# Patient Record
Sex: MALE | Race: WHITE | NOT HISPANIC OR LATINO | Employment: FULL TIME | URBAN - METROPOLITAN AREA
[De-identification: names, ages, dates, MRNs, and addresses within clinical notes are randomized per-mention and may not be internally consistent; named-entity substitution may affect disease eponyms.]

---

## 2023-04-03 DIAGNOSIS — E78.5 HYPERLIPIDEMIA, UNSPECIFIED: ICD-10-CM

## 2023-04-03 RX ORDER — ROSUVASTATIN CALCIUM 40 MG/1
TABLET, COATED ORAL
Qty: 90 TABLET | Refills: 0 | Status: SHIPPED | OUTPATIENT
Start: 2023-04-03 | End: 2023-07-03

## 2023-04-17 ENCOUNTER — HOSPITAL ENCOUNTER (OUTPATIENT)
Dept: DATA CONVERSION | Facility: HOSPITAL | Age: 73
End: 2023-04-18
Attending: ORTHOPAEDIC SURGERY | Admitting: ORTHOPAEDIC SURGERY
Payer: MEDICARE

## 2023-04-17 DIAGNOSIS — Z98.52 VASECTOMY STATUS: ICD-10-CM

## 2023-04-17 DIAGNOSIS — F17.200 NICOTINE DEPENDENCE, UNSPECIFIED, UNCOMPLICATED: ICD-10-CM

## 2023-04-17 DIAGNOSIS — I48.91 UNSPECIFIED ATRIAL FIBRILLATION (MULTI): ICD-10-CM

## 2023-04-17 DIAGNOSIS — Z79.891 LONG TERM (CURRENT) USE OF OPIATE ANALGESIC: ICD-10-CM

## 2023-04-17 DIAGNOSIS — E78.5 HYPERLIPIDEMIA, UNSPECIFIED: ICD-10-CM

## 2023-04-17 DIAGNOSIS — I71.20 THORACIC AORTIC ANEURYSM, WITHOUT RUPTURE, UNSPECIFIED (CMS-HCC): ICD-10-CM

## 2023-04-17 DIAGNOSIS — M24.052 LOOSE BODY IN LEFT HIP: ICD-10-CM

## 2023-04-17 DIAGNOSIS — M16.12 UNILATERAL PRIMARY OSTEOARTHRITIS, LEFT HIP: ICD-10-CM

## 2023-04-17 DIAGNOSIS — Z79.82 LONG TERM (CURRENT) USE OF ASPIRIN: ICD-10-CM

## 2023-04-17 DIAGNOSIS — M25.752 OSTEOPHYTE, LEFT HIP: ICD-10-CM

## 2023-04-18 LAB
ANION GAP IN SER/PLAS: 11 MMOL/L (ref 10–20)
BASOPHILS (10*3/UL) IN BLOOD BY AUTOMATED COUNT: 0.02 X10E9/L (ref 0–0.1)
BASOPHILS/100 LEUKOCYTES IN BLOOD BY AUTOMATED COUNT: 0.2 % (ref 0–2)
CALCIUM (MG/DL) IN SER/PLAS: 7.9 MG/DL (ref 8.6–10.3)
CARBON DIOXIDE, TOTAL (MMOL/L) IN SER/PLAS: 25 MMOL/L (ref 21–32)
CHLORIDE (MMOL/L) IN SER/PLAS: 104 MMOL/L (ref 98–107)
CREATININE (MG/DL) IN SER/PLAS: 1.04 MG/DL (ref 0.5–1.3)
EOSINOPHILS (10*3/UL) IN BLOOD BY AUTOMATED COUNT: 0.12 X10E9/L (ref 0–0.4)
EOSINOPHILS/100 LEUKOCYTES IN BLOOD BY AUTOMATED COUNT: 1.5 % (ref 0–6)
ERYTHROCYTE DISTRIBUTION WIDTH (RATIO) BY AUTOMATED COUNT: 13.1 % (ref 11.5–14.5)
ERYTHROCYTE MEAN CORPUSCULAR HEMOGLOBIN CONCENTRATION (G/DL) BY AUTOMATED: 30.2 G/DL (ref 32–36)
ERYTHROCYTE MEAN CORPUSCULAR VOLUME (FL) BY AUTOMATED COUNT: 98 FL (ref 80–100)
ERYTHROCYTES (10*6/UL) IN BLOOD BY AUTOMATED COUNT: 3.78 X10E12/L (ref 4.5–5.9)
GFR MALE: 75 ML/MIN/1.73M2
GLUCOSE (MG/DL) IN SER/PLAS: 93 MG/DL (ref 74–99)
HEMATOCRIT (%) IN BLOOD BY AUTOMATED COUNT: 37.1 % (ref 41–52)
HEMOGLOBIN (G/DL) IN BLOOD: 11.2 G/DL (ref 13.5–17.5)
IMMATURE GRANULOCYTES/100 LEUKOCYTES IN BLOOD BY AUTOMATED COUNT: 0.4 % (ref 0–0.9)
LEUKOCYTES (10*3/UL) IN BLOOD BY AUTOMATED COUNT: 8.1 X10E9/L (ref 4.4–11.3)
LYMPHOCYTES (10*3/UL) IN BLOOD BY AUTOMATED COUNT: 0.97 X10E9/L (ref 0.8–3)
LYMPHOCYTES/100 LEUKOCYTES IN BLOOD BY AUTOMATED COUNT: 12 % (ref 13–44)
MONOCYTES (10*3/UL) IN BLOOD BY AUTOMATED COUNT: 0.71 X10E9/L (ref 0.05–0.8)
MONOCYTES/100 LEUKOCYTES IN BLOOD BY AUTOMATED COUNT: 8.8 % (ref 2–10)
NEUTROPHILS (10*3/UL) IN BLOOD BY AUTOMATED COUNT: 6.2 X10E9/L (ref 1.6–5.5)
NEUTROPHILS/100 LEUKOCYTES IN BLOOD BY AUTOMATED COUNT: 77.1 % (ref 40–80)
PLATELETS (10*3/UL) IN BLOOD AUTOMATED COUNT: 144 X10E9/L (ref 150–450)
POTASSIUM (MMOL/L) IN SER/PLAS: 4.2 MMOL/L (ref 3.5–5.3)
SODIUM (MMOL/L) IN SER/PLAS: 136 MMOL/L (ref 136–145)
UREA NITROGEN (MG/DL) IN SER/PLAS: 16 MG/DL (ref 6–23)

## 2023-07-01 DIAGNOSIS — R35.1 NOCTURIA: ICD-10-CM

## 2023-07-01 DIAGNOSIS — E78.5 HYPERLIPIDEMIA, UNSPECIFIED: ICD-10-CM

## 2023-07-01 DIAGNOSIS — E78.5 DYSLIPIDEMIA, GOAL LDL BELOW 70: Primary | ICD-10-CM

## 2023-07-03 RX ORDER — ROSUVASTATIN CALCIUM 40 MG/1
TABLET, COATED ORAL
Qty: 90 TABLET | Refills: 0 | Status: SHIPPED | OUTPATIENT
Start: 2023-07-03 | End: 2023-10-05

## 2023-09-07 VITALS — BODY MASS INDEX: 27.02 KG/M2 | HEIGHT: 70 IN | WEIGHT: 188.71 LBS

## 2023-09-14 NOTE — DISCHARGE SUMMARY
Send Summary:   Discharge Summary Providers:  Provider Role Provider Name   · Attending CHRISSY COCHRAN   · Referring CHRISSY COCHRAN   · Consulting Stanton Lott   · Consulting Jozef Iraheta   · Primary Tequila Mayberry       Note Recipients: CHRISSY COCHRAN DO Mulligan, Erika, DO - [phone] [Preferred]       Discharge:    Summary:   Admission Date: .17-Apr-2023 06:05:00   Discharge Date: 18-Apr-2023   Attending Physician at Discharge: CHRISSY COCHRAN   Admission Reason: L DJD   Final Discharge Diagnoses: Osteoarthritis of hip   Procedures: Date: 17-Apr-2023 10:46:00  Procedure Name: 1. Direct anterior approach left total hip arthroplasty   2.   3.   4.   5.   Condition at Discharge: Satisfactory   Disposition at Discharge: .Home   Vital Signs:        T   P  R  BP   MAP  SpO2   Value  37  53  18  117/65      97%  Date/Time 4/17 22:10 4/18 5:31 4/18 5:31 4/18 5:31    4/18 5:31  Range  (37C - 37C )  (47 - 53 )  (18 - 18 )  (106 - 138 )/ (60 - 65 )    (97% - 98% )  Highest temp of 37 C was recorded at 4/17 12:15    Date:            Weight/Scale Type:  Height:   17-Apr-2023 12:24  85.6  kg         177.8  cm  Physical Exam:    Constitutional: Well developed, awake/alert/oriented x3, no distress, alert and cooperative  Head/Neck: NCAT  Respiratory/Thorax: Nonlabored breathing at rest  Cardiovascular: Warm, well perfused extremities  Extremities: See MSK  Neurological: Patient denies numbness and tingling of LLE  Psychological: Appropriate mood and behavior  Skin: Warm and dry. Surgical incision is clean, dry and intact.  Skin around the incision is soft, compressible, and non erythematous.   MSK: LLE  - SILT s/s/sp/dp/t  - fires PF/DF/EHL  - Toes WWP, 2+ DP pulses  - Calf soft and supple bilat   Hospital Course:    Patient was admitted s/p L DAA ALBERTO. Pt had uneventful hospital course. Pain was controlled with multimodal analgesics. Physical and Occupational therapy was started  POD 1 and patient progressed well. After  discussion and evaluation patient has elected to go home for continued care and physical therapy. Dressing is clean, dry and intact, with no signs of infection. At time of discharge the patient was instructed about  continued care and will follow up in office for further evaluation.       Discharge Information:    and Continuing Care:   Lab Results - Pending:    None  Radiology Results - Pending: Xray Fluoroscopy Up  To 1 Hr or less at 17-Apr-2023 15:04:00   Discharge Instructions:    Activity:           activity as tolerated.          May shower..            May not drive.            Weight-bearing Instructions: weight-bearing as tolerated left leg.            Total Hip Precautions Anterior approach: FOR 2 WEEKS: NO straight leg raises, NO backward kicks and NO bending over to put your shoes/socks or reach the floor. No crossing your legs or ankles.  Use ice frequently day and night for 2 weeks.   Continue to wear compression stockings everyday. May take stockings off at night to sleep but reapply each morning for the next 2 weeks until seen by your surgeon at your follow up appt.    Nutrition/Diet:           resume normal diet    Wound Care:           Wound Site:   Left Total Hip Replacement          Wound Type:   surgical incision          Cover With:   Mepilex          Instructions:   no lotions, creams, or tub soaks          Other Instructions:   Do not remove Mepilex AG dressing from the hip  until follow up visit in 2 weeks with doctor. If dressing becomes saturated and starts leaking  notify your surgeon.   Call the office if having increased redness, pain, swelling,  drainage at incision or if you have fever and chills.  May shower. pat dressing dry, no soap, no lotions and  no soaking.    Home Care Certification:           Home Care Agency:    [facility] [phone]          Skilled Disciplines Ordered:   PT    Home Care Services:           Home Care Skilled Service:   Rehab (PT/OT/SP eval and  treat)    Discharge Medications: Home Medication   Aspirin Enteric Coated 325 mg oral delayed release tablet - 1 tab(s) orally 2 times a day   CeleBREX 200 mg oral capsule - 1 cap(s) orally 2 times a day   Colace 100 mg oral capsule - 1 cap(s) orally 2 times a day   gabapentin 300 mg oral capsule - 1 cap(s) orally 3 times a day   oxycodone-acetaminophen 5 mg-325 mg oral tablet - 1 tab(s) orally 4 times a day (after meals and at bedtime) as needed for pain  Soma 350 mg oral tablet - 1 tab(s) orally 3 times a day as needed for muscle spasms  Vitamin C 500 mg oral tablet - 1 tab(s) orally 2 times a day   aspirin 81 mg oral tablet - null  rosuvastatin 40 mg oral tablet - 1 tab(s) orally once a day in the afternoon  dottera ongard - 1 dose(s)  once a day  bimatoprost 0.01% ophthalmic solution - 1 drop(s) to each affected eye once a day (at bedtime)     PRN Medication   cetirizine 10 mg oral tablet - 1 tab(s) orally once a day, As Needed     DNR Status:   ·  Code Status Code Status order at time of discharge: Full Code     Attestation:   Note Completion:  I am a:  Resident/Fellow   Attending Attestation I saw and evaluated the patient.  I personally obtained the key and critical portions of the history and physical exam or was physically present for key and  critical portions performed by the resident/fellow. I reviewed the resident/fellow?s documentation and discussed the patient with the resident/fellow.  I agree with the resident/fellow?s medical decision making as documented in the note.     I personally evaluated the patient on 18-Apr-2023         Electronic Signatures:  Ryann Levin (DO (Resident))  (Signed 18-Apr-2023 07:16)   Authored: Send Summary, Summary Content, Ongoing Care,  DNR Status, Note Completion  CHRISSY COCHRAN (DO)  (Signed 18-Apr-2023 07:34)   Authored: Note Completion   Co-Signer: Send Summary, Summary Content, Ongoing Care, DNR Status, Note Completion      Last Updated: 18-Apr-2023 07:34 by DIMAS  CHRISSY AGUAYO)

## 2023-09-14 NOTE — PROGRESS NOTES
Service: General Internal Medicine     Subjective Data:   JEANINE GARCIA is a 73 year old Male who is Hospital Day # 2 and POD #1 for 1. Direct anterior approach left total hip arthroplasty ;2. ;3. ;4. ;5.     Pt seen and examined at bedside today.   Doing well. No medical issues or concerns to address.  Reports pain very well controlled basically not there. Also having bowel movements per his report.    Objective Data:     Objective Information:      T   P  R  BP   MAP  SpO2   Value  37  53  18  117/65      97%  Date/Time 4/17 22:10 4/18 5:31 4/18 5:31 4/18 5:31    4/18 5:31  Range  (37C - 37C )  (47 - 53 )  (18 - 18 )  (106 - 138 )/ (60 - 65 )    (97% - 98% )  Highest temp of 37 C was recorded at 4/17 12:15      Pain reported at 4/18 7:52: 0 = None    Physical Exam Narrative:  ·  Physical Exam:    Constitutional: Pleasant, Awake/Alert/Oriented to person place and time. No distress  Cardiovascular: Regular rate and rhythm, S1, S2. No extra heart sounds or murmurs  Respiratory: Clear to auscultation bilaterally. No wheezing, rales or rhonchi. Good chest wall expansion  Abdomen: Soft, Nontender. Bowel sounds appreciated  Musculoskeletal: Left hip surgical dressing clean/dry/intact. No pain reported. Muscle strength appears grossly intact.   Neurological:  Sensation grossly intact. Muscle strength as stated above.  Extremities: Warm and dry. Surgical dressing left hip as stated above  Psychiatric: Appropriate mood and affect      Medication:    Medications:      CENTRAL NERVOUS SYSTEM AGENTS:    1. Acetaminophen:  650  mg  Oral  Every 6 Hours    2. Aspirin Enteric Coated:  325  mg  Oral  2 Times a Day    3. HYDROmorphone Injectable:  0.4  mg  IntraVenous Push  Every 2 Hours   PRN       4. oxyCODONE Immediate Release:  5  mg  Oral  Every 4 Hours   PRN       5. oxyCODONE Immediate Release:  10  mg  Oral  Every 4 Hours   PRN       6. traMADol:  50  mg  Oral  Every 6 Hours   PRN       7. Gabapentin:  300  mg  Oral   3 Times a Day   PRN       8. Ondansetron Injectable:  4  mg  IntraVenous Push  Every 6 Hours   PRN       9. Carisoprodol:  350  mg  Oral  Every 8 Hours   PRN         GASTROINTESTINAL AGENTS:    1. Docusate:  100  mg  Oral  2 Times a Day    2. Polyethylene Glycol:  17  gram(s)  Oral  2 Times a Day    3. Pantoprazole:  40  mg  Oral  Daily      METABOLIC AGENTS:    1. Rosuvastatin:  40  mg  Oral  Daily      NUTRITIONAL PRODUCTS:    1. Lactated Ringers Infusion:  1000  mL  IntraVenous  <Continuous>    2. Ascorbic Acid:  500  mg  Oral  2 Times a Day      RESPIRATORY AGENTS:    1. diphenhydrAMINE Injectable:  12.5  mg  IntraVenous Push  Every 6 Hours   PRN         TOPICAL AGENTS:    1. Sore Throat Lozenge:  1  lozenge(s)  Oral  Every 4 Hours   PRN       2. Latanoprost 0.005% Ophthalmic:  1  drop(s)  Both Eyes  At Bedtime      Recent Lab Results:    Results:    CBC: 4/18/2023 05:07              \     Hgb     /                              \     11.2 L    /  WBC  ----------------  Plt               8.1       ----------------    144 L            /     Hct     \                              /     37.1 L    \            RBC: 3.78 L    MCV: 98     Neutrophil %: 77.1      BMP: 4/18/2023 05:07  NA+        Cl-     BUN  /                         136    104    16  /  --------------------------------  Glucose                ---------------------------  93    K+     HCO3-   Creat \                         4.2  25    1.04  \  Calcium : 7.9 L    Anion Gap : 11      Radiology Results:    Results:        Impression:     Expected postoperative appearance.        Signed by Darrin Teran MD     Xray Pelvis 1 or 2 View [Apr 17 2023 11:40AM]      Assessment and Plan:   Code Status:  ·  Code Status Full Code     Assessment:    This is a 74yo male with medical history significant for Afib, HLD, OA, Thoracic aneurysm who had Direct Anterior Approach  Left Total Hip Arthroplasty by Dr. Buck on 4/17/23 for which medicine is consulted for  medical management.   Assessment/Plan:  # Left Hip Osteoarthritis   ·  Status post Direct Anterior Approach Left Total Hip Arthroplasty by Dr. Buck on 4/17/23  ·  Pain regimen and Bowel regimen per orthopaedic surgery primary service.   ·  Antibiotics per orthopaedic surgery primary service  ·  Defer DVT prophylaxis regimen to orthopaedic primary service who has on ASA 325mg PO BID    # HLD:  Rosuvastatin 40mg PO daily  # Afib: Not on any rate/rhythm control medications nor anticoagulation. Can defer further management at this time to outpatient providers and re-address need for medications should issues arise.     Fluids: No maintenance fluids at this time  Electrolytes: Monitor and address abnormalities as needed  Nutrition: Regular diet  GI prophylaxis: Protonix 40mg PO daily  DVT prophylaxis: ASA 325mg PO BID (per ortho primary), SCD    Disposition:  Patient medically clear for discharge from medicine consultant perspective. Discharge per the orthopaedic surgery primary service.   If any acute medical issues or questions arise, feel free to [place] myself (Stanton Lott DO) from 7am-7pm or nocturnist from 7pm-7am.  Thank you for allowing us to participate in management of this patient.       Electronic Signatures:  Stanton Lott)  (Signed 18-Apr-2023 09:51)   Authored: Service, Subjective Data, Objective Data, Assessment  and Plan, Note Completion      Last Updated: 18-Apr-2023 09:51 by Stanton Lott ()

## 2023-09-14 NOTE — H&P
History & Physical Reviewed:   I have reviewed the History and Physical dated:  29-Mar-2023   History and Physical reviewed and relevant findings noted. Patient examined to review pertinent physical  findings.: No significant changes   Home Medications Reviewed: no changes noted   Allergies Reviewed: no changes noted       ERAS (Enhanced Recovery After Surgery):  ·  ERAS Patient: no     Consent:   COVID-19 Consent:  ·  COVID-19 Risk Consent Surgeon has reviewed key risks related to the risk of anant COVID-19 and if they contract COVID-19 what the risks are.     Attestation:   Note Completion:  I am a:  Resident/Fellow   Attending Attestation I saw and evaluated the patient.  I personally obtained the key and critical portions of the history and physical exam or was physically present for key and  critical portions performed by the resident/fellow. I reviewed the resident/fellow?s documentation and discussed the patient with the resident/fellow.  I agree with the resident/fellow?s medical decision making as documented in the note.     I personally evaluated the patient on 17-Apr-2023         Electronic Signatures:  Ryann Levin (Resident))  (Signed 17-Apr-2023 06:21)   Authored: History & Physical Reviewed, ERAS, Consent,  Note Completion  CHRISSY COCHRAN (DO)  (Signed 17-Apr-2023 07:22)   Authored: Note Completion   Co-Signer: History & Physical Reviewed, ERAS, Consent, Note Completion      Last Updated: 17-Apr-2023 07:22 by CHRISSY COCHRAN ()

## 2023-10-02 NOTE — OP NOTE
Post Operative Note:     PreOp Diagnosis: Left Hip Osteoarthritis   Post-Procedure Diagnosis: Left hip osteoarthritis   Procedure: 1. Direct anterior approach left total  hip arthroplasty   2.   3.   4.   5.   Surgeon: Dr. Cochran   Resident/Fellow/Other Assistant: Minerva Levin   Anesthesia: Spinal, MAC   I.V. Fluids: See anesthesia report   Estimated Blood Loss (mL): 250   Blood Replacement: None   Specimen: no   Complications: None   Findings: See post op report   Patient Returned To/Condition: PACU; stable condition   Urine Output: See anesthesia report   Tourniquet Times: None   Implants: Pato     Operative Report Dictated:  Dictation: yes   Date of Dictation: 17-Apr-2023     Attestation:   Note Completion:  I am a: Advanced Practice Provider   Attending Only - Shared Visit with Advanced Practice Provider This is a shared visit.  I have reviewed the Advanced Practice Provider?s encounter note, approve the Advanced Practice Provider?s documentation,  and provide the following additional information from my personal encounter.    Attending Attestation I was present for the entire procedure    Comments/ Additional Findings    As Above          Electronic Signatures:  Sukumar Palma (PAC)  (Signed 17-Apr-2023 10:53)   Authored: Post Operative Note, Note Completion  CHRISSY COCHRAN (DO)  (Signed 17-Apr-2023 10:54)   Authored: Note Completion   Co-Signer: Post Operative Note, Note Completion      Last Updated: 17-Apr-2023 10:54 by CHRISSY COCHRAN (DO)

## 2023-10-05 DIAGNOSIS — E78.5 HYPERLIPIDEMIA, UNSPECIFIED: ICD-10-CM

## 2023-10-05 RX ORDER — ROSUVASTATIN CALCIUM 40 MG/1
TABLET, COATED ORAL
Qty: 90 TABLET | Refills: 0 | Status: SHIPPED | OUTPATIENT
Start: 2023-10-05 | End: 2024-01-11

## 2023-10-06 ENCOUNTER — APPOINTMENT (OUTPATIENT)
Dept: PRIMARY CARE | Facility: CLINIC | Age: 73
End: 2023-10-06
Payer: MEDICARE

## 2023-10-19 PROBLEM — H93.8X1 FULLNESS IN EAR, RIGHT: Status: ACTIVE | Noted: 2023-10-19

## 2023-10-19 PROBLEM — H90.3 BILATERAL SENSORINEURAL HEARING LOSS: Status: ACTIVE | Noted: 2023-10-19

## 2023-10-19 PROBLEM — H26.9 CATARACTS, BILATERAL: Status: ACTIVE | Noted: 2023-10-19

## 2023-10-19 PROBLEM — I71.20 THORACIC AORTIC ANEURYSM (TAA) (CMS-HCC): Status: ACTIVE | Noted: 2023-10-19

## 2023-10-19 PROBLEM — R93.1 ABNORMAL HEART SCORE CT: Status: ACTIVE | Noted: 2023-10-19

## 2023-10-19 PROBLEM — H69.91 DYSFUNCTION OF RIGHT EUSTACHIAN TUBE: Status: ACTIVE | Noted: 2023-10-19

## 2023-10-19 PROBLEM — E04.1 THYROID NODULE: Status: ACTIVE | Noted: 2023-10-19

## 2023-10-19 PROBLEM — R35.1 NOCTURIA: Status: ACTIVE | Noted: 2023-10-19

## 2023-10-19 PROBLEM — I48.91 ATRIAL FIBRILLATION (MULTI): Status: ACTIVE | Noted: 2023-10-19

## 2023-10-19 PROBLEM — R79.89 ELEVATED LFTS: Status: ACTIVE | Noted: 2023-10-19

## 2023-10-19 PROBLEM — B35.3 TINEA PEDIS OF BOTH FEET: Status: ACTIVE | Noted: 2023-10-19

## 2023-10-19 PROBLEM — E78.5 DYSLIPIDEMIA, GOAL LDL BELOW 70: Status: ACTIVE | Noted: 2023-10-19

## 2023-10-19 RX ORDER — DOCUSATE SODIUM 100 MG/1
CAPSULE, LIQUID FILLED ORAL
COMMUNITY
Start: 2023-04-14 | End: 2023-10-20 | Stop reason: ALTCHOICE

## 2023-10-19 RX ORDER — BIMATOPROST 0.1 MG/ML
SOLUTION/ DROPS OPHTHALMIC
COMMUNITY

## 2023-10-19 RX ORDER — CELECOXIB 200 MG/1
CAPSULE ORAL
COMMUNITY
Start: 2023-04-14 | End: 2023-10-20 | Stop reason: ALTCHOICE

## 2023-10-19 RX ORDER — BRIMONIDINE TARTRATE 1 MG/ML
SOLUTION/ DROPS OPHTHALMIC
COMMUNITY
Start: 2020-06-01 | End: 2023-10-20 | Stop reason: ALTCHOICE

## 2023-10-19 RX ORDER — GABAPENTIN 300 MG/1
CAPSULE ORAL
COMMUNITY
Start: 2023-04-14 | End: 2023-10-20 | Stop reason: ALTCHOICE

## 2023-10-19 RX ORDER — ASPIRIN 325 MG
TABLET, DELAYED RELEASE (ENTERIC COATED) ORAL
COMMUNITY
End: 2023-10-20 | Stop reason: ALTCHOICE

## 2023-10-19 RX ORDER — ROSUVASTATIN CALCIUM 10 MG/1
1 TABLET, COATED ORAL DAILY
COMMUNITY
End: 2023-10-20 | Stop reason: ALTCHOICE

## 2023-10-19 RX ORDER — CETIRIZINE HYDROCHLORIDE 10 MG/1
10 TABLET ORAL DAILY
COMMUNITY
End: 2023-11-07 | Stop reason: HOSPADM

## 2023-10-19 RX ORDER — ASPIRIN 81 MG/1
81 TABLET ORAL DAILY
COMMUNITY
End: 2023-11-07 | Stop reason: HOSPADM

## 2023-10-19 RX ORDER — APIXABAN 5 MG/1
1 TABLET, FILM COATED ORAL 2 TIMES DAILY
COMMUNITY
Start: 2023-01-03 | End: 2023-10-30 | Stop reason: SDDI

## 2023-10-19 RX ORDER — COLLAGENASE CLOSTRIDIUM HISTOLYTICUM 0.9 MG
KIT INJECTION
COMMUNITY
Start: 2023-01-06 | End: 2023-10-20 | Stop reason: ALTCHOICE

## 2023-10-20 ENCOUNTER — ANCILLARY PROCEDURE (OUTPATIENT)
Dept: PREADMISSION TESTING | Facility: HOSPITAL | Age: 73
End: 2023-10-20
Payer: MEDICARE

## 2023-10-20 VITALS
TEMPERATURE: 97.9 F | HEART RATE: 72 BPM | BODY MASS INDEX: 26.42 KG/M2 | HEIGHT: 70 IN | DIASTOLIC BLOOD PRESSURE: 73 MMHG | SYSTOLIC BLOOD PRESSURE: 129 MMHG | RESPIRATION RATE: 18 BRPM | WEIGHT: 184.53 LBS | OXYGEN SATURATION: 97 %

## 2023-10-20 DIAGNOSIS — Z01.818 PREOP EXAMINATION: Primary | ICD-10-CM

## 2023-10-20 DIAGNOSIS — I48.91 ATRIAL FIBRILLATION, UNSPECIFIED TYPE (MULTI): ICD-10-CM

## 2023-10-20 LAB
ANION GAP SERPL CALC-SCNC: 11 MMOL/L (ref 10–20)
APPEARANCE UR: CLEAR
BILIRUB UR STRIP.AUTO-MCNC: NEGATIVE MG/DL
BUN SERPL-MCNC: 8 MG/DL (ref 6–23)
CALCIUM SERPL-MCNC: 8.9 MG/DL (ref 8.6–10.3)
CHLORIDE SERPL-SCNC: 103 MMOL/L (ref 98–107)
CO2 SERPL-SCNC: 29 MMOL/L (ref 21–32)
COLOR UR: ABNORMAL
CREAT SERPL-MCNC: 0.97 MG/DL (ref 0.5–1.3)
ERYTHROCYTE [DISTWIDTH] IN BLOOD BY AUTOMATED COUNT: 12.9 % (ref 11.5–14.5)
GFR SERPL CREATININE-BSD FRML MDRD: 81 ML/MIN/1.73M*2
GLUCOSE SERPL-MCNC: 75 MG/DL (ref 74–99)
GLUCOSE UR STRIP.AUTO-MCNC: NEGATIVE MG/DL
HCT VFR BLD AUTO: 39.9 % (ref 41–52)
HGB BLD-MCNC: 12.7 G/DL (ref 13.5–17.5)
HOLD SPECIMEN: NORMAL
KETONES UR STRIP.AUTO-MCNC: NEGATIVE MG/DL
LEUKOCYTE ESTERASE UR QL STRIP.AUTO: NEGATIVE
MCH RBC QN AUTO: 28.8 PG (ref 26–34)
MCHC RBC AUTO-ENTMCNC: 31.8 G/DL (ref 32–36)
MCV RBC AUTO: 91 FL (ref 80–100)
NITRITE UR QL STRIP.AUTO: NEGATIVE
NRBC BLD-RTO: 0 /100 WBCS (ref 0–0)
PH UR STRIP.AUTO: 6 [PH]
PLATELET # BLD AUTO: 269 X10*3/UL (ref 150–450)
PMV BLD AUTO: 10.5 FL (ref 7.5–11.5)
POTASSIUM SERPL-SCNC: 3.9 MMOL/L (ref 3.5–5.3)
PROT UR STRIP.AUTO-MCNC: NEGATIVE MG/DL
RBC # BLD AUTO: 4.41 X10*6/UL (ref 4.5–5.9)
RBC # UR STRIP.AUTO: ABNORMAL /UL
RBC #/AREA URNS AUTO: NORMAL /HPF
SODIUM SERPL-SCNC: 139 MMOL/L (ref 136–145)
SP GR UR STRIP.AUTO: 1.01
UROBILINOGEN UR STRIP.AUTO-MCNC: <2 MG/DL
WBC # BLD AUTO: 10.1 X10*3/UL (ref 4.4–11.3)
WBC #/AREA URNS AUTO: NORMAL /HPF

## 2023-10-20 PROCEDURE — 87081 CULTURE SCREEN ONLY: CPT | Mod: CMCLAB,GEALAB

## 2023-10-20 PROCEDURE — 85027 COMPLETE CBC AUTOMATED: CPT

## 2023-10-20 PROCEDURE — 93010 ELECTROCARDIOGRAM REPORT: CPT | Performed by: INTERNAL MEDICINE

## 2023-10-20 PROCEDURE — 36415 COLL VENOUS BLD VENIPUNCTURE: CPT

## 2023-10-20 PROCEDURE — 82435 ASSAY OF BLOOD CHLORIDE: CPT

## 2023-10-20 PROCEDURE — 81001 URINALYSIS AUTO W/SCOPE: CPT

## 2023-10-20 PROCEDURE — 93005 ELECTROCARDIOGRAM TRACING: CPT

## 2023-10-20 PROCEDURE — 99214 OFFICE O/P EST MOD 30 MIN: CPT | Performed by: PHYSICIAN ASSISTANT

## 2023-10-20 RX ORDER — CHLORHEXIDINE GLUCONATE ORAL RINSE 1.2 MG/ML
15 SOLUTION DENTAL DAILY
Qty: 473 ML | Refills: 0 | Status: SHIPPED | OUTPATIENT
Start: 2023-10-20 | End: 2023-11-07 | Stop reason: HOSPADM

## 2023-10-20 ASSESSMENT — CHADS2 SCORE
CHF: NO
AGE GREATER THAN OR EQUAL TO 75: YES
CHADS2 SCORE: 1
HYPERTENSION: NO
DIABETES: NO
PRIOR STROKE OR TIA OR THROMBOEMBOLISM: NO

## 2023-10-20 ASSESSMENT — LIFESTYLE VARIABLES: SMOKING_STATUS: NONSMOKER

## 2023-10-20 ASSESSMENT — DUKE ACTIVITY SCORE INDEX (DASI)
CAN YOU TAKE CARE OF YOURSELF (EAT, DRESS, BATHE, OR USE TOILET): YES
CAN YOU CLIMB A FLIGHT OF STAIRS OR WALK UP A HILL: YES
TOTAL_SCORE: 34.7
CAN YOU DO LIGHT WORK AROUND THE HOUSE LIKE DUSTING OR WASHING DISHES: YES
CAN YOU WALK INDOORS, SUCH AS AROUND YOUR HOUSE: YES
CAN YOU DO MODERATE WORK AROUND THE HOUSE LIKE VACUUMING, SWEEPING FLOORS OR CARRYING GROCERIES: YES
CAN YOU DO HEAVY WORK AROUND THE HOUSE LIKE SCRUBBING FLOORS OR LIFTING AND MOVING HEAVY FURNITURE: NO
CAN YOU RUN A SHORT DISTANCE: NO
CAN YOU WALK A BLOCK OR TWO ON LEVEL GROUND: YES
CAN YOU DO YARD WORK LIKE RAKING LEAVES, WEEDING OR PUSHING A MOWER: YES
DASI METS SCORE: 7
CAN YOU HAVE SEXUAL RELATIONS: YES
CAN YOU PARTICIPATE IN MODERATE RECREATIONAL ACTIVITIES LIKE GOLF, BOWLING, DANCING, DOUBLES TENNIS OR THROWING A BASEBALL OR FOOTBALL: YES
CAN YOU PARTICIPATE IN STRENOUS SPORTS LIKE SWIMMING, SINGLES TENNIS, FOOTBALL, BASKETBALL, OR SKIING: NO

## 2023-10-20 ASSESSMENT — PAIN SCALES - GENERAL: PAINLEVEL_OUTOF10: 1

## 2023-10-20 ASSESSMENT — ENCOUNTER SYMPTOMS
CARDIOVASCULAR NEGATIVE: 1
NEUROLOGICAL NEGATIVE: 1
NECK NEGATIVE: 1
RESPIRATORY NEGATIVE: 1
GASTROINTESTINAL NEGATIVE: 1
LOSS OF MOTION: 1
CONSTITUTIONAL NEGATIVE: 1
MUSCULOSKELETAL NEGATIVE: 1
HIP PAIN: 1

## 2023-10-20 ASSESSMENT — PAIN - FUNCTIONAL ASSESSMENT: PAIN_FUNCTIONAL_ASSESSMENT: 0-10

## 2023-10-20 NOTE — CPM/PAT H&P
CPM/PAT Evaluation       Name: Ok Chris)  /Age: 1950/73 y.o.     In-Person       Chief Complaint: hip pain    Hip Pain   There was no injury mechanism. The pain is present in the right hip. The pain is at a severity of 1/10. The pain has been Fluctuating since onset. Associated symptoms include a loss of motion. He reports no foreign bodies present. Nothing aggravates the symptoms. He has tried NSAIDs, acetaminophen and rest for the symptoms.       Past Medical History:   Diagnosis Date    Atrial fibrillation (CMS/Formerly [facility])     Encounter for removal of sutures 2020    Visit for suture removal    Hyperlipidemia, unspecified 2019    Borderline hyperlipidemia    Laceration without foreign body of unspecified ear, initial encounter 2020    Laceration of ear lobe    OA (osteoarthritis)     Personal history of other diseases of the circulatory system 2016    History of irregular heartbeat    Personal history of other specified conditions 2020    History of syncope    Personal history of traumatic brain injury 2016    History of concussion    Thoracic aortic aneurysm (CMS/[facility]ly [facility])     3.7cm 2022       Past Surgical History:   Procedure Laterality Date    EYE SURGERY      HERNIA REPAIR  2016    Hernia Repair    OTHER SURGICAL HISTORY  2016    Dental Implant    TONSILLECTOMY  2016    Tonsillectomy    TOTAL HIP ARTHROPLASTY  2023    VASECTOMY  2016    Surgery Vas Deferens Vasectomy       Patient  has no history on file for sexual activity.    Family History   Problem Relation Name Age of Onset    Other (CARDIAC DISORDER) Mother      Heart failure Mother      Cancer Brother      Melanoma Brother      Heart attack Brother         Allergies   Allergen Reactions    Bee Venom Protein (Honey Bee) Unknown       Prior to Admission medications    Medication Sig Start Date End Date Taking? Authorizing Provider   aspirin 81 mg EC tablet Take 1 tablet (81  mg) by mouth once daily.   Yes Historical Provider, MD   cetirizine (ZyrTEC) 10 mg tablet Take 1 tablet (10 mg) by mouth once daily.   Yes Historical Provider, MD   Lumigan 0.01 % ophthalmic solution INSTILL 1 DROP INTO BOTH EYES ONCE PER DAY AT BEDTIME.   Yes Historical Provider, MD   rosuvastatin (Crestor) 40 mg tablet TAKE 1 TABLET BY MOUTH EVERY DAY  Patient taking differently: Pt takes in afternoon 10/5/23  Yes Tequila Mayberry,    chlorhexidine (Peridex) 0.12 % solution Use 15 mL in the mouth or throat once daily. Swish and spit one capful the night before surgery and morning  of surgery 10/20/23 1/18/24  [facility]   Eliquis 5 mg tablet Take 1 tablet (5 mg) by mouth 2 times a day. 1/3/23   Historical Provider, MD   aspirin 325 mg EC tablet Take by mouth.  10/20/23  Historical Provider, MD   brimonidine (Alphagan P) 0.1 % ophthalmic solution Administer into affected eye(s). 6/1/20 10/20/23  Historical Provider, MD   celecoxib (CeleBREX) 200 mg capsule Take by mouth. 4/14/23 10/20/23  Historical Provider, MD   docusate sodium (Colace) 100 mg capsule Take by mouth. 4/14/23 10/20/23  Historical Provider, MD   gabapentin (Neurontin) 300 mg capsule Take by mouth. 4/14/23 10/20/23  Historical Provider, MD   rosuvastatin (Crestor) 10 mg tablet Take 1 tablet (10 mg) by mouth once daily. Pt takes in afternoon  10/20/23  Historical Provider, MD   Xiaflex injection  1/6/23 10/20/23  Historical Provider, MD        PAT ROS:   Constitutional:   neg    Neuro/Psych:   neg    Eyes:   Ears:   Nose:   Mouth:   neg    Throat:   neg    Neck:   neg    Cardio:   neg    Respiratory:   neg    Endocrine:   GI:   neg    :   neg    Musculoskeletal:   neg    Hematologic:   neg    Skin:      Physical Exam  Vitals and nursing note reviewed.   Constitutional:       Appearance: Normal appearance.   HENT:      Head: Normocephalic and atraumatic.      Nose: Nose normal.      Mouth/Throat:      Mouth: Mucous membranes are  moist.      Pharynx: Oropharynx is clear.   Eyes:      Conjunctiva/sclera: Conjunctivae normal.      Pupils: Pupils are equal, round, and reactive to light.   Cardiovascular:      Rate and Rhythm: Normal rate and regular rhythm.      Pulses: Normal pulses.      Heart sounds: Normal heart sounds.   Abdominal:      General: Abdomen is flat. Bowel sounds are normal.      Palpations: Abdomen is soft.   Musculoskeletal:      Right hip: Decreased range of motion.      Left hip: Normal.      Right ankle: Normal.      Left ankle: Normal.   Skin:     General: Skin is warm and dry.   Neurological:      General: No focal deficit present.      Mental Status: He is alert.   Psychiatric:         Mood and Affect: Mood normal.         Behavior: Behavior normal.          PAT AIRWAY:   Airway:     Mallampati::  II    Neck ROM::  Full  normal        Vitals:    10/20/23 0837   BP: 129/73   Pulse: 72   Resp: 18   Temp: 36.6 °C (97.9 °F)   SpO2: 97%          DASI Risk Score      Flowsheet Row Most Recent Value   DASI SCORE 34.7   METS Score (Will be calculated only when all the questions are answered) 7          Caprini DVT Assessment      Flowsheet Row Most Recent Value   DVT Score 11   Current Status Major surgery planned, including arthroscopic and laproscopic (1-2 hours), Elective major lower extremity arthroplasty   History Prior major surgery   Age 60-75 years   BMI 30 or less          Modified Frailty Index    No data to display       CHADS2 Stroke Risk         N/A 3 - 100%: High Risk   2 - 3%: Medium Risk   0 - 2%: Low Risk     Last Change: N/A          This score determines the patient's risk of having a stroke if the patient has atrial fibrillation.        This score is not applicable to this patient. Components are not calculated.          Revised Cardiac Risk Index      Flowsheet Row Most Recent Value   Revised Cardiac Risk Calculator 0          Apfel Simplified Score      Flowsheet Row Most Recent Value   Apfel Simplified  Score Calculator 2          Risk Analysis Index Results This Encounter    No data found in the last 1 encounters.       Stop Bang Score      Flowsheet Row Most Recent Value   Do you snore loudly? 0   Do you often feel tired or fatigued after your sleep? 0   Has anyone ever observed you stop breathing in your sleep? 0   Do you have or are you being treated for high blood pressure? 0   Recent BMI (Calculated) 26.5   Is BMI greater than 35 kg/m2? 0=No   Age older than 50 years old? 1=Yes   Is your neck circumference greater than 17 inches (Male) or 16 inches (Female)? 0            Assessment and Plan:   Patient is a 73 year old male referred to PAT by  anticipating a  R ALBERTO.     PMH significant for:   Atrial fib: will get cardiac clearance, scheduled 10/31/23   OA: scheduled for surgery  Thoracic aneurysm: 3.7cm on CT angio chest 6/24/22     Anesthesia issues: no    H/O DVT: no     Sleep apnea: no    H/O transfusions: no      EKG:  10/20/23 atrial fib, nonspecific T wave changes     Clearances: Dr. Pena  Discussed patient with fercho Eden to proceed     Patient verbalized understanding of preop instructions given in PAT

## 2023-10-20 NOTE — PREPROCEDURE INSTRUCTIONS
Current Medications   Medication Instructions    aspirin 81 mg EC tablet Continue until night before surgery    cetirizine (ZyrTEC) 10 mg tablet Stop 1 day before surgery    Lumigan 0.01 % ophthalmic solution Continue until night before surgery    rosuvastatin (Crestor) 40 mg tablet Stop 1 day before surgery                       NPO Instructions:    Do not eat any food after midnight the night before your surgery/procedure.  You may have clear liquids until TWO hours before surgery/procedure. This includes water, black tea/coffee, (no milk or cream) apple juice and electrolyte drinks (Gatorade).  You may chew gum up to TWO hours before your surgery/procedure.    Additional Instructions:             SURGERY PRE-OPERATIVE INSTRUCTIONS    *You will receive a phone call the day before your procedure  after 2pm, (or the Friday before your surgery if scheduled on a Monday.) Generally the hospital will be calling you with this information after that time.    *You may take tylenol for pain/discomfort as needed.     *Stop taking all aspirins, ibuprofen (motrin/advil), naproxen (aleve/naprosyn) for one week prior to surgery.     *You should not have alcoholic beverages for 24 hours before surgery.     *You should not smoke 24 hours prior to surgery.     *To help prevent surgical infections bathe/shower with dial soap the evening before surgery.    *If you wear glasses, please bring a case for the glasses with you.    *You will be asked to remove dentures and contacts.     *Please leave all valuables at home.    *You should remove all make-up and nail polish at home.    *You should wear loose, comfortable clothing that will accommodate bandages and/or casts.    *You should notify your doctor of any change in your condition (fever, cold, rash, etc). Surgery may need to be re-scheduled until a time you are in better health.    *A responsible adult is required to accompany you to and from the hospital if you are receiving  anesthesia or a sedative. Patients are not permitted to drive for 24 hours after anesthesia.      *If you have any further questions please call [phone].                CHG INSTRUCTIONS    *Begin using your CHG soap five days prior to your scheduled surgery.  Allow the CHG soap to sit on skin for 3 minutes. Do not wash with regular soap after you have used the CHG soap. Pat yourself dry with a clean, fresh towel.    *Wash your face with normal soap and water. Apply the CHG solution to a clean, wet washcloth. Firmly lather your entire body from the neck down. Do not use on your face.     *Do not apply powders, deodorants, or lotions after using CHG wash.    *Dress in clean, freshly laundered night clothes.    *Be sure to sleep with clean, freshly laundered sheets.     *Be aware CHG wash may cause stains on fabrics. Rinse your washcloth and other linens that come in contact with CHG completely. Use non-chlorine detergents to launder items used.     *The morning of surgery is the fifth day, repeat the CHG wash and wear fresh laundered clothes.     *If you have any questions about the CHG soap, call [phone].      MOUTHRINSE INSTRUCTIONS    *CHG oral rinse is used to kill a bacteria in the mouth known as Staphylococcus aureus. This reduces the risks of surgical site infections.     *Using dental rinse: use the CHG oral rinse after you brush your teeth the night before and the morning of the surgery. Follow all directions on your prescription label.     *Use 1 capful (15ml), swish and gargle for at least 30 seconds. Do not swallow. Spit rinse out.     *Do not rinse mouth with water, eat or drink after using CHG mouth rinse.     *Possible side effects: CHG rinse will stick to plaque on teeth. Brush and floss just before use. Teeth brushing will help to avoid staining of plaque during use.    *Any questions, please call [phone].

## 2023-10-20 NOTE — H&P (VIEW-ONLY)
CPM/PAT Evaluation       Name: Ok Chris)  /Age: 1950/73 y.o.     In-Person       Chief Complaint: hip pain    Hip Pain   There was no injury mechanism. The pain is present in the right hip. The pain is at a severity of 1/10. The pain has been Fluctuating since onset. Associated symptoms include a loss of motion. He reports no foreign bodies present. Nothing aggravates the symptoms. He has tried NSAIDs, acetaminophen and rest for the symptoms.       Past Medical History:   Diagnosis Date    Atrial fibrillation (CMS/[facility])     Encounter for removal of sutures 2020    Visit for suture removal    Hyperlipidemia, unspecified 2019    Borderline hyperlipidemia    Laceration without foreign body of unspecified ear, initial encounter 2020    Laceration of ear lobe    OA (osteoarthritis)     Personal history of other diseases of the circulatory system 2016    History of irregular heartbeat    Personal history of other specified conditions 2020    History of syncope    Personal history of traumatic brain injury 2016    History of concussion    Thoracic aortic aneurysm (CMS/[facility])     3.7cm 2022       Past Surgical History:   Procedure Laterality Date    EYE SURGERY      HERNIA REPAIR  2016    Hernia Repair    OTHER SURGICAL HISTORY  2016    Dental Implant    TONSILLECTOMY  2016    Tonsillectomy    TOTAL HIP ARTHROPLASTY  2023    VASECTOMY  2016    Surgery Vas Deferens Vasectomy       Patient  has no history on file for sexual activity.    Family History   Problem Relation Name Age of Onset    Other (CARDIAC DISORDER) Mother      Heart failure Mother      Cancer Brother      Melanoma Brother      Heart attack Brother         Allergies   Allergen Reactions    Bee Venom Protein (Honey Bee) Unknown       Prior to Admission medications    Medication Sig Start Date End Date Taking? Authorizing Provider   aspirin 81 mg EC tablet Take 1 tablet (81  mg) by mouth once daily.   Yes Historical Provider, MD   cetirizine (ZyrTEC) 10 mg tablet Take 1 tablet (10 mg) by mouth once daily.   Yes Historical Provider, MD   Lumigan 0.01 % ophthalmic solution INSTILL 1 DROP INTO BOTH EYES ONCE PER DAY AT BEDTIME.   Yes Historical Provider, MD   rosuvastatin (Crestor) 40 mg tablet TAKE 1 TABLET BY MOUTH EVERY DAY  Patient taking differently: Pt takes in afternoon 10/5/23  Yes Tequila Mayberry,    chlorhexidine (Peridex) 0.12 % solution Use 15 mL in the mouth or throat once daily. Swish and spit one capful the night before surgery and morning  of surgery 10/20/23 1/18/24  [facility]   Eliquis 5 mg tablet Take 1 tablet (5 mg) by mouth 2 times a day. 1/3/23   Historical Provider, MD   aspirin 325 mg EC tablet Take by mouth.  10/20/23  Historical Provider, MD   brimonidine (Alphagan P) 0.1 % ophthalmic solution Administer into affected eye(s). 6/1/20 10/20/23  Historical Provider, MD   celecoxib (CeleBREX) 200 mg capsule Take by mouth. 4/14/23 10/20/23  Historical Provider, MD   docusate sodium (Colace) 100 mg capsule Take by mouth. 4/14/23 10/20/23  Historical Provider, MD   gabapentin (Neurontin) 300 mg capsule Take by mouth. 4/14/23 10/20/23  Historical Provider, MD   rosuvastatin (Crestor) 10 mg tablet Take 1 tablet (10 mg) by mouth once daily. Pt takes in afternoon  10/20/23  Historical Provider, MD   Xiaflex injection  1/6/23 10/20/23  Historical Provider, MD        PAT ROS:   Constitutional:   neg    Neuro/Psych:   neg    Eyes:   Ears:   Nose:   Mouth:   neg    Throat:   neg    Neck:   neg    Cardio:   neg    Respiratory:   neg    Endocrine:   GI:   neg    :   neg    Musculoskeletal:   neg    Hematologic:   neg    Skin:      Physical Exam  Vitals and nursing note reviewed.   Constitutional:       Appearance: Normal appearance.   HENT:      Head: Normocephalic and atraumatic.      Nose: Nose normal.      Mouth/Throat:      Mouth: Mucous membranes are  moist.      Pharynx: Oropharynx is clear.   Eyes:      Conjunctiva/sclera: Conjunctivae normal.      Pupils: Pupils are equal, round, and reactive to light.   Cardiovascular:      Rate and Rhythm: Normal rate and regular rhythm.      Pulses: Normal pulses.      Heart sounds: Normal heart sounds.   Abdominal:      General: Abdomen is flat. Bowel sounds are normal.      Palpations: Abdomen is soft.   Musculoskeletal:      Right hip: Decreased range of motion.      Left hip: Normal.      Right ankle: Normal.      Left ankle: Normal.   Skin:     General: Skin is warm and dry.   Neurological:      General: No focal deficit present.      Mental Status: He is alert.   Psychiatric:         Mood and Affect: Mood normal.         Behavior: Behavior normal.          PAT AIRWAY:   Airway:     Mallampati::  II    Neck ROM::  Full  normal        Vitals:    10/20/23 0837   BP: 129/73   Pulse: 72   Resp: 18   Temp: 36.6 °C (97.9 °F)   SpO2: 97%          DASI Risk Score      Flowsheet Row Most Recent Value   DASI SCORE 34.7   METS Score (Will be calculated only when all the questions are answered) 7          Caprini DVT Assessment      Flowsheet Row Most Recent Value   DVT Score 11   Current Status Major surgery planned, including arthroscopic and laproscopic (1-2 hours), Elective major lower extremity arthroplasty   History Prior major surgery   Age 60-75 years   BMI 30 or less          Modified Frailty Index    No data to display       CHADS2 Stroke Risk         N/A 3 - 100%: High Risk   2 - 3%: Medium Risk   0 - 2%: Low Risk     Last Change: N/A          This score determines the patient's risk of having a stroke if the patient has atrial fibrillation.        This score is not applicable to this patient. Components are not calculated.          Revised Cardiac Risk Index      Flowsheet Row Most Recent Value   Revised Cardiac Risk Calculator 0          Apfel Simplified Score      Flowsheet Row Most Recent Value   Apfel Simplified  Score Calculator 2          Risk Analysis Index Results This Encounter    No data found in the last 1 encounters.       Stop Bang Score      Flowsheet Row Most Recent Value   Do you snore loudly? 0   Do you often feel tired or fatigued after your sleep? 0   Has anyone ever observed you stop breathing in your sleep? 0   Do you have or are you being treated for high blood pressure? 0   Recent BMI (Calculated) 26.5   Is BMI greater than 35 kg/m2? 0=No   Age older than 50 years old? 1=Yes   Is your neck circumference greater than 17 inches (Male) or 16 inches (Female)? 0            Assessment and Plan:   Patient is a 73 year old male referred to PAT by  anticipating a  R ALBERTO.     PMH significant for:   Atrial fib: will get cardiac clearance, scheduled 10/31/23   OA: scheduled for surgery  Thoracic aneurysm: 3.7cm on CT angio chest 6/24/22     Anesthesia issues: no    H/O DVT: no     Sleep apnea: no    H/O transfusions: no      EKG:  10/20/23 atrial fib, nonspecific T wave changes     Clearances: Dr. Pena  Discussed patient with fercho Eden to proceed     Patient verbalized understanding of preop instructions given in PAT

## 2023-10-22 LAB — STAPHYLOCOCCUS SPEC CULT: NORMAL

## 2023-10-30 ENCOUNTER — OFFICE VISIT (OUTPATIENT)
Dept: PRIMARY CARE | Facility: CLINIC | Age: 73
End: 2023-10-30
Payer: MEDICARE

## 2023-10-30 VITALS
HEART RATE: 78 BPM | DIASTOLIC BLOOD PRESSURE: 79 MMHG | HEIGHT: 70 IN | SYSTOLIC BLOOD PRESSURE: 129 MMHG | BODY MASS INDEX: 25.48 KG/M2 | WEIGHT: 178 LBS

## 2023-10-30 DIAGNOSIS — I28.8 OTHER DISEASES OF PULMONARY VESSELS (MULTI): ICD-10-CM

## 2023-10-30 DIAGNOSIS — D64.9 ANEMIA, UNSPECIFIED TYPE: ICD-10-CM

## 2023-10-30 DIAGNOSIS — E78.5 DYSLIPIDEMIA, GOAL LDL BELOW 70: ICD-10-CM

## 2023-10-30 DIAGNOSIS — I48.11 LONGSTANDING PERSISTENT ATRIAL FIBRILLATION (MULTI): ICD-10-CM

## 2023-10-30 DIAGNOSIS — R35.1 NOCTURIA: ICD-10-CM

## 2023-10-30 DIAGNOSIS — Z00.00 ROUTINE GENERAL MEDICAL EXAMINATION AT HEALTH CARE FACILITY: Primary | ICD-10-CM

## 2023-10-30 PROCEDURE — 1036F TOBACCO NON-USER: CPT | Performed by: INTERNAL MEDICINE

## 2023-10-30 PROCEDURE — 99214 OFFICE O/P EST MOD 30 MIN: CPT | Performed by: INTERNAL MEDICINE

## 2023-10-30 PROCEDURE — 1160F RVW MEDS BY RX/DR IN RCRD: CPT | Performed by: INTERNAL MEDICINE

## 2023-10-30 PROCEDURE — 1170F FXNL STATUS ASSESSED: CPT | Performed by: INTERNAL MEDICINE

## 2023-10-30 PROCEDURE — 1159F MED LIST DOCD IN RCRD: CPT | Performed by: INTERNAL MEDICINE

## 2023-10-30 PROCEDURE — 1125F AMNT PAIN NOTED PAIN PRSNT: CPT | Performed by: INTERNAL MEDICINE

## 2023-10-30 PROCEDURE — G0439 PPPS, SUBSEQ VISIT: HCPCS | Performed by: INTERNAL MEDICINE

## 2023-10-30 ASSESSMENT — ACTIVITIES OF DAILY LIVING (ADL)
TAKING_MEDICATION: INDEPENDENT
BATHING: INDEPENDENT
GROCERY_SHOPPING: INDEPENDENT
DRESSING: INDEPENDENT
DOING_HOUSEWORK: INDEPENDENT
MANAGING_FINANCES: INDEPENDENT

## 2023-10-30 NOTE — PATIENT INSTRUCTIONS
"Chronic atrial fibrillation, has been off eliquis  Discussed risk of stroke, recommend restart after hip replacement  Has appt with cardiology tomorrow    Hyperlipidemia, get fasting labs    I recommend that you get the shingrix shots. Shingles vaccination requires a 2 shots series divided by 2 to 6 months. Get at the pharmacy. Ask the pharmacist \"how much\" prior to injected due cost varies based on your insurance. Shingrix can make you feel tired and achy for a few days after so do not get it prior to a big event . Are free currently    Recommend the covid booster    Checking psa for prostate screening.     Mild anemia, likely due to last hip surgery, checking iron, b12, blood counts    "

## 2023-10-30 NOTE — PROGRESS NOTES
Subjective   Patient ID: Ok Chris is a 73 y.o. male who presents for Medicare Annual Wellness Visit Subsequent.    HPI     Review of Systems    Objective   There were no vitals taken for this visit.    Physical Exam    Assessment/Plan

## 2023-10-30 NOTE — PROGRESS NOTES
"Subjective   Reason for Visit: Ok Chris is an 73 y.o. male here for a Medicare Wellness visit.     Past Medical, Surgical, and Family History reviewed and updated in chart.    Reviewed all medications by prescribing practitioner or clinical pharmacist (such as prescriptions, OTCs, herbal therapies and supplements) and documented in the medical record.    Having right hip replacement in 1 week, had a left ALBERTO 6 months ago  No problems with anesthesia with last one  No cp or pressure  No sob, or childs  No LH spells, no palpitations  Bowels are regular, no blood  Urine flow is good, nocturia 1-2,   He exercises in the early evening and drinks a lot of water   He does [employer] bike, does some weight and some cardio.  Is seeing cardiology for cardiac clearance tomorrow    He was on aspirin after his last hip  Has not been taking his eliquis.               Patient Care Team:  Tequila Mayberry DO as PCP - General  Tequila Mayberry DO as PCP - [employer] PCP     Review of Systems    Objective   Vitals:  /79   Pulse 78   Ht 1.778 m (5' 10\")   Wt 80.7 kg (178 lb)   BMI 25.54 kg/m²       Physical Exam  Constitutional:       Appearance: Normal appearance.   HENT:      Head: Normocephalic.   Neck:      Vascular: No carotid bruit.   Cardiovascular:      Rate and Rhythm: Normal rate. Rhythm irregular.      Heart sounds: No murmur heard.  Pulmonary:      Effort: Pulmonary effort is normal.      Breath sounds: Normal breath sounds.   Chest:   Breasts:     Right: No mass.      Left: No mass.   Abdominal:      General: Abdomen is flat.      Palpations: Abdomen is soft. There is no mass.      Tenderness: There is no abdominal tenderness.      Comments: No HSM   Musculoskeletal:         General: No swelling or deformity.      Right lower leg: No edema.      Left lower leg: No edema.   Lymphadenopathy:      Cervical: No cervical adenopathy.      Upper Body:      Right upper body: No supraclavicular or " "axillary adenopathy.      Left upper body: No supraclavicular or axillary adenopathy.   Skin:     Coloration: Skin is not jaundiced or pale.      Findings: No rash.   Neurological:      Mental Status: He is alert and oriented to person, place, and time.   Psychiatric:         Mood and Affect: Mood normal.         Assessment/Plan   Problem List Items Addressed This Visit    None  Visit Diagnoses       Routine general medical examination at health care facility    -  Primary            Patient Instructions   Chronic atrial fibrillation, has been off eliquis  Discussed risk of stroke, recommend restart after hip replacement    Hyperlipidemia, get fasting labs    I recommend that you get the shingrix shots. Shingles vaccination requires a 2 shots series divided by 2 to 6 months. Get at the pharmacy. Ask the pharmacist \"how much\" prior to injected due cost varies based on your insurance. Shingrix can make you feel tired and achy for a few days after so do not get it prior to a big event . Are free currently    Recommend the covid booster    Checking psa for prostate screening.     Mild anemia, likely due to last hip surgery, checking iron, b12, blood counts        "

## 2023-10-31 ENCOUNTER — LAB (OUTPATIENT)
Dept: LAB | Facility: LAB | Age: 73
End: 2023-10-31
Payer: MEDICARE

## 2023-10-31 ENCOUNTER — OFFICE VISIT (OUTPATIENT)
Dept: CARDIOLOGY | Facility: HOSPITAL | Age: 73
End: 2023-10-31
Payer: MEDICARE

## 2023-10-31 VITALS
DIASTOLIC BLOOD PRESSURE: 63 MMHG | HEART RATE: 50 BPM | OXYGEN SATURATION: 99 % | WEIGHT: 186.51 LBS | SYSTOLIC BLOOD PRESSURE: 111 MMHG | BODY MASS INDEX: 26.76 KG/M2

## 2023-10-31 DIAGNOSIS — I48.11 LONGSTANDING PERSISTENT ATRIAL FIBRILLATION (MULTI): ICD-10-CM

## 2023-10-31 DIAGNOSIS — Z01.818 PRE-OPERATIVE CLEARANCE: Primary | ICD-10-CM

## 2023-10-31 DIAGNOSIS — E78.5 DYSLIPIDEMIA, GOAL LDL BELOW 70: ICD-10-CM

## 2023-10-31 DIAGNOSIS — I48.21 PERMANENT ATRIAL FIBRILLATION (MULTI): ICD-10-CM

## 2023-10-31 DIAGNOSIS — R35.1 NOCTURIA: ICD-10-CM

## 2023-10-31 DIAGNOSIS — D64.9 ANEMIA, UNSPECIFIED TYPE: ICD-10-CM

## 2023-10-31 DIAGNOSIS — E78.5 HYPERLIPIDEMIA, UNSPECIFIED: ICD-10-CM

## 2023-10-31 LAB
ALBUMIN SERPL BCP-MCNC: 4.2 G/DL (ref 3.4–5)
ALP SERPL-CCNC: 92 U/L (ref 33–136)
ALT SERPL W P-5'-P-CCNC: 12 U/L (ref 10–52)
ANION GAP SERPL CALC-SCNC: 13 MMOL/L (ref 10–20)
AST SERPL W P-5'-P-CCNC: 17 U/L (ref 9–39)
BASOPHILS # BLD AUTO: 0.03 X10*3/UL (ref 0–0.1)
BASOPHILS NFR BLD AUTO: 0.4 %
BILIRUB SERPL-MCNC: 0.6 MG/DL (ref 0–1.2)
BUN SERPL-MCNC: 8 MG/DL (ref 6–23)
CALCIUM SERPL-MCNC: 9 MG/DL (ref 8.6–10.3)
CHLORIDE SERPL-SCNC: 105 MMOL/L (ref 98–107)
CHOLEST SERPL-MCNC: 133 MG/DL (ref 0–199)
CHOLESTEROL/HDL RATIO: 3.2
CK SERPL-CCNC: 84 U/L (ref 0–325)
CO2 SERPL-SCNC: 29 MMOL/L (ref 21–32)
CREAT SERPL-MCNC: 0.92 MG/DL (ref 0.5–1.3)
EOSINOPHIL # BLD AUTO: 0.19 X10*3/UL (ref 0–0.4)
EOSINOPHIL NFR BLD AUTO: 2.8 %
ERYTHROCYTE [DISTWIDTH] IN BLOOD BY AUTOMATED COUNT: 13.4 % (ref 11.5–14.5)
FERRITIN SERPL-MCNC: 563 NG/ML (ref 20–300)
GFR SERPL CREATININE-BSD FRML MDRD: 87 ML/MIN/1.73M*2
GLUCOSE SERPL-MCNC: 85 MG/DL (ref 74–99)
HCT VFR BLD AUTO: 43.1 % (ref 41–52)
HDLC SERPL-MCNC: 42.1 MG/DL
HGB BLD-MCNC: 12.9 G/DL (ref 13.5–17.5)
IMM GRANULOCYTES # BLD AUTO: 0.02 X10*3/UL (ref 0–0.5)
IMM GRANULOCYTES NFR BLD AUTO: 0.3 % (ref 0–0.9)
IRON SATN MFR SERPL: 25 % (ref 25–45)
IRON SERPL-MCNC: 81 UG/DL (ref 35–150)
LDLC SERPL CALC-MCNC: 79 MG/DL
LYMPHOCYTES # BLD AUTO: 1.5 X10*3/UL (ref 0.8–3)
LYMPHOCYTES NFR BLD AUTO: 22.3 %
MCH RBC QN AUTO: 28.4 PG (ref 26–34)
MCHC RBC AUTO-ENTMCNC: 29.9 G/DL (ref 32–36)
MCV RBC AUTO: 95 FL (ref 80–100)
MONOCYTES # BLD AUTO: 0.49 X10*3/UL (ref 0.05–0.8)
MONOCYTES NFR BLD AUTO: 7.3 %
NEUTROPHILS # BLD AUTO: 4.5 X10*3/UL (ref 1.6–5.5)
NEUTROPHILS NFR BLD AUTO: 66.9 %
NON HDL CHOLESTEROL: 91 MG/DL (ref 0–149)
NRBC BLD-RTO: 0 /100 WBCS (ref 0–0)
PLATELET # BLD AUTO: 285 X10*3/UL (ref 150–450)
PMV BLD AUTO: 10.3 FL (ref 7.5–11.5)
POTASSIUM SERPL-SCNC: 4.8 MMOL/L (ref 3.5–5.3)
PROT SERPL-MCNC: 6.6 G/DL (ref 6.4–8.2)
RBC # BLD AUTO: 4.54 X10*6/UL (ref 4.5–5.9)
SODIUM SERPL-SCNC: 142 MMOL/L (ref 136–145)
TIBC SERPL-MCNC: 318 UG/DL (ref 240–445)
TRIGL SERPL-MCNC: 61 MG/DL (ref 0–149)
TSH SERPL-ACNC: 1.87 MIU/L (ref 0.44–3.98)
UIBC SERPL-MCNC: 237 UG/DL (ref 110–370)
VLDL: 12 MG/DL (ref 0–40)
WBC # BLD AUTO: 6.7 X10*3/UL (ref 4.4–11.3)

## 2023-10-31 PROCEDURE — 82550 ASSAY OF CK (CPK): CPT

## 2023-10-31 PROCEDURE — 1160F RVW MEDS BY RX/DR IN RCRD: CPT | Performed by: NURSE PRACTITIONER

## 2023-10-31 PROCEDURE — 84153 ASSAY OF PSA TOTAL: CPT

## 2023-10-31 PROCEDURE — 1125F AMNT PAIN NOTED PAIN PRSNT: CPT | Performed by: NURSE PRACTITIONER

## 2023-10-31 PROCEDURE — 85025 COMPLETE CBC W/AUTO DIFF WBC: CPT

## 2023-10-31 PROCEDURE — 99214 OFFICE O/P EST MOD 30 MIN: CPT | Performed by: NURSE PRACTITIONER

## 2023-10-31 PROCEDURE — 82728 ASSAY OF FERRITIN: CPT

## 2023-10-31 PROCEDURE — 80061 LIPID PANEL: CPT

## 2023-10-31 PROCEDURE — 1159F MED LIST DOCD IN RCRD: CPT | Performed by: NURSE PRACTITIONER

## 2023-10-31 PROCEDURE — 84443 ASSAY THYROID STIM HORMONE: CPT

## 2023-10-31 PROCEDURE — 82607 VITAMIN B-12: CPT

## 2023-10-31 PROCEDURE — 80053 COMPREHEN METABOLIC PANEL: CPT

## 2023-10-31 PROCEDURE — 1036F TOBACCO NON-USER: CPT | Performed by: NURSE PRACTITIONER

## 2023-10-31 PROCEDURE — 83550 IRON BINDING TEST: CPT

## 2023-10-31 PROCEDURE — 83540 ASSAY OF IRON: CPT

## 2023-10-31 PROCEDURE — 36415 COLL VENOUS BLD VENIPUNCTURE: CPT

## 2023-10-31 NOTE — LETTER
October 31, 2023     Bebeto Buck [place]  [id number] [facility] [address]    Patient: Ok Chris   YOB: 1950   Date of Visit: 10/31/2023       Dear Dr. Bebeto Buck DO:    Thank you for referring Ok Chris to me for evaluation. Below are my notes for this consultation.  If you have questions, please do not hesitate to call me. I look forward to following your patient along with you.       Sincerely,     Deeptih Angela, ANGELINA-CNP      CC: No Recipients  ______________________________________________________________________________________    Chief Complaint:   Pre-op Clearance     History Of Present Illness:    Ok Chris is a 73 y.o. male presenting today for pre-operative cardiovascular evaluation prior to upcoming right hip replacement surgery.  Has a h/p permanent a.fib which he has tolerated for many years.  Denies symptoms of chest pain, SOB, palpitations, or dizziness.  Was advised in January to start taking Apixaban given increase in [id number] score d/t age.  He did obtain the prescription from the pharmacy, however, did not start taking it because at that time he had left hip surgery scheduled.  He also did not start taking apixaban after hip surgery.       Last Recorded Vitals:  Vitals:    10/31/23 1013   BP: 111/63   Pulse: 50   SpO2: 99%   Weight: 84.6 kg (186 lb 8.2 oz)       Past Medical History:  He has a past medical history of Atrial fibrillation (CMS/HCC), Encounter for removal of sutures (07/01/2020), Hyperlipidemia, unspecified (03/12/2019), Laceration without foreign body of unspecified ear, initial encounter (07/01/2020), OA (osteoarthritis), Personal history of other diseases of the circulatory system (06/23/2016), Personal history of other specified conditions (07/30/2020), Personal history of traumatic brain injury (06/24/2016), and Thoracic aortic aneurysm (CMS/HCC).    Past Surgical History:  He has a past surgical history that  includes Tonsillectomy (06/24/2016); Vasectomy (06/24/2016); Hernia repair (06/23/2016); Other surgical history (06/23/2016); Total hip arthroplasty (04/2023); and Eye surgery.      Social History:  He reports that he has never smoked. He has never used smokeless tobacco. He reports that he does not currently use alcohol. He reports current drug use. Drug: Marijuana.    Family History:  Family History   Problem Relation Name Age of Onset   • Other (CARDIAC DISORDER) Mother     • Heart failure Mother     • Cancer Brother     • Melanoma Brother     • Heart attack Brother          Allergies:  Bee venom protein (honey bee)    Outpatient Medications:  Current Outpatient Medications   Medication Instructions   • aspirin 81 mg, oral, Daily   • cetirizine (ZYRTEC) 10 mg, oral, Daily   • chlorhexidine (Peridex) 0.12 % solution 15 mL, Mouth/Throat, Daily, Swish and spit one capful the night before surgery and morning  of surgery   • Lumigan 0.01 % ophthalmic solution INSTILL 1 DROP INTO BOTH EYES ONCE PER DAY AT BEDTIME.   • rosuvastatin (Crestor) 40 mg tablet TAKE 1 TABLET BY MOUTH EVERY DAY       Physical Exam:  Constitutional: Pleasant, Awake/Alert/Oriented to person place and time. No distress  Head: Atraumatic, Normocephalic  Eyes: EOMI. MAYELA  Neck:No JVD  Cardiovascular: irreg, irreg, no murmur   Respiratory: Clear to auscultation bilaterally. No wheezing, rales or rhonchi. Good chest wall expansion  Abdomen: Soft, Nontender, Obese. Bowel sounds appreciated  Musculoskeletal: ROM intact. Muscle strength grossly intact upper and lower extremities 5/5.   Neurological: CNII-XII intact. Sensation grossly intact  Extremities: Warm and dry. No acute rashes and lesions  Psychiatric: Appropriate mood and affect       Last Labs:  CBC -  Lab Results   Component Value Date    WBC 10.1 10/20/2023    HGB 12.7 (L) 10/20/2023    HCT 39.9 (L) 10/20/2023    MCV 91 10/20/2023     10/20/2023       CMP -  Lab Results   Component  "Value Date    CALCIUM 8.9 10/20/2023    PROT 6.5 04/27/2022    ALBUMIN 4.3 04/27/2022    AST 18 04/27/2022    ALT 13 04/27/2022    ALKPHOS 77 04/27/2022    BILITOT 1.1 04/27/2022       LIPID PANEL -   Lab Results   Component Value Date    CHOL 141 04/27/2022    TRIG 54 04/27/2022    HDL 47.8 04/27/2022    CHHDL 2.9 04/27/2022    LDLF 82 04/27/2022    VLDL 11 04/27/2022       RENAL FUNCTION PANEL -   Lab Results   Component Value Date    GLUCOSE 75 10/20/2023     10/20/2023    K 3.9 10/20/2023     10/20/2023    CO2 29 10/20/2023    ANIONGAP 11 10/20/2023    BUN 8 10/20/2023    CREATININE 0.97 10/20/2023    GFRMALE 75 04/18/2023    CALCIUM 8.9 10/20/2023    ALBUMIN 4.3 04/27/2022        No results found for: \"BNP\", \"HGBA1C\"    Last Cardiology Tests:  ECG:  10/20/2023 independently reviewed: erica, HR 72bpm    Echo:  6/2/2020  CONCLUSIONS:   1. The left ventricular systolic function is normal with a 55-60% estimated ejection fraction.   2. Spectral Doppler shows an abnormal pattern of left ventricular diastolic filling.   3. The left atrium is severely dilated.   4. The right atrium is severely dilated.   5. Mild to moderate mitral valve regurgitation.   6. Mild aortic valve stenosis.   7. There is mild aortic valve regurgitation.   8. The patient is in atrial fibrillation which may influence the estimate of left ventricular function and transvalvular flows.    Stress Test:  7/29/2020  IMPRESSION:  1. Normal myocardial perfusion study without evidence of ischemia or  prior infarction.  2. The left ventricle is normal in size.  3. Normal LV wall motion with an LV EF estimated at greater than 50%.      Lab review: I have personally reviewed the laboratory result(s)   Diagnostic review: I have personally reviewed the result(s) of the Echocardiogram and stress     Assessment/Plan  Very pleasant 72 yo male from home with h/o permanent a.fib, dyslipidemia presenting today for pre-operative cardiac evaluation " prior to upcoming right hip replacement surgery.      Plan:  -May proceed to the OR with mild risk for perioperative cardiovascular event.   -[id number] score is 2-- recommend starting apixaban 5mg BID post-operatively when safe from an orthopedic standpoint  -Follow up in 1 year or sooner if needed       ANGELINA Velazco-CNP

## 2023-10-31 NOTE — PROGRESS NOTES
Chief Complaint:   Pre-op Clearance     History Of Present Illness:    Ok Chris is a 73 y.o. male presenting today for pre-operative cardiovascular evaluation prior to upcoming right hip replacement surgery.  Has a h/p permanent a.fib which he has tolerated for many years.  Denies symptoms of chest pain, SOB, palpitations, or dizziness.  Was advised in January to start taking Apixaban given increase in [id number] score d/t age.  He did obtain the prescription from the pharmacy, however, did not start taking it because at that time he had left hip surgery scheduled.  He also did not start taking apixaban after hip surgery.       Last Recorded Vitals:  Vitals:    10/31/23 1013   BP: 111/63   Pulse: 50   SpO2: 99%   Weight: 84.6 kg (186 lb 8.2 oz)       Past Medical History:  He has a past medical history of Atrial fibrillation (CMS/[facility]), Encounter for removal of sutures (07/01/2020), Hyperlipidemia, unspecified (03/12/2019), Laceration without foreign body of unspecified ear, initial encounter (07/01/2020), OA (osteoarthritis), Personal history of other diseases of the circulatory system (06/23/2016), Personal history of other specified conditions (07/30/2020), Personal history of traumatic brain injury (06/24/2016), and Thoracic aortic aneurysm (CMS/[facility]).    Past Surgical History:  He has a past surgical history that includes Tonsillectomy (06/24/2016); Vasectomy (06/24/2016); Hernia repair (06/23/2016); Other surgical history (06/23/2016); Total hip arthroplasty (04/2023); and Eye surgery.      Social History:  He reports that he has never smoked. He has never used smokeless tobacco. He reports that he does not currently use alcohol. He reports current drug use. Drug: Marijuana.    Family History:  Family History   Problem Relation Name Age of Onset    Other (CARDIAC DISORDER) Mother      Heart failure Mother      Cancer Brother      Melanoma Brother      Heart attack Brother          Allergies:  Bee venom  protein (honey bee)    Outpatient Medications:  Current Outpatient Medications   Medication Instructions    aspirin 81 mg, oral, Daily    cetirizine (ZYRTEC) 10 mg, oral, Daily    chlorhexidine (Peridex) 0.12 % solution 15 mL, Mouth/Throat, Daily, Swish and spit one capful the night before surgery and morning  of surgery    Lumigan 0.01 % ophthalmic solution INSTILL 1 DROP INTO BOTH EYES ONCE PER DAY AT BEDTIME.    rosuvastatin (Crestor) 40 mg tablet TAKE 1 TABLET BY MOUTH EVERY DAY       Physical Exam:  Constitutional: Pleasant, Awake/Alert/Oriented to person place and time. No distress  Head: Atraumatic, Normocephalic  Eyes: EOMI. MAYELA  Neck:No JVD  Cardiovascular: irreg, irreg, no murmur   Respiratory: Clear to auscultation bilaterally. No wheezing, rales or rhonchi. Good chest wall expansion  Abdomen: Soft, Nontender, Obese. Bowel sounds appreciated  Musculoskeletal: ROM intact. Muscle strength grossly intact upper and lower extremities 5/5.   Neurological: CNII-XII intact. Sensation grossly intact  Extremities: Warm and dry. No acute rashes and lesions  Psychiatric: Appropriate mood and affect       Last Labs:  CBC -  Lab Results   Component Value Date    WBC 10.1 10/20/2023    HGB 12.7 (L) 10/20/2023    HCT 39.9 (L) 10/20/2023    MCV 91 10/20/2023     10/20/2023       CMP -  Lab Results   Component Value Date    CALCIUM 8.9 10/20/2023    PROT 6.5 04/27/2022    ALBUMIN 4.3 04/27/2022    AST 18 04/27/2022    ALT 13 04/27/2022    ALKPHOS 77 04/27/2022    BILITOT 1.1 04/27/2022       LIPID PANEL -   Lab Results   Component Value Date    CHOL 141 04/27/2022    TRIG 54 04/27/2022    HDL 47.8 04/27/2022    CHHDL 2.9 04/27/2022    LDLF 82 04/27/2022    VLDL 11 04/27/2022       RENAL FUNCTION PANEL -   Lab Results   Component Value Date    GLUCOSE 75 10/20/2023     10/20/2023    K 3.9 10/20/2023     10/20/2023    CO2 29 10/20/2023    ANIONGAP 11 10/20/2023    BUN 8 10/20/2023    CREATININE 0.97  "10/20/2023    GFRMALE 75 04/18/2023    CALCIUM 8.9 10/20/2023    ALBUMIN 4.3 04/27/2022        No results found for: \"BNP\", \"HGBA1C\"    Last Cardiology Tests:  ECG:  10/20/2023 independently reviewed: erica, HR 72bpm    Echo:  6/2/2020  CONCLUSIONS:   1. The left ventricular systolic function is normal with a 55-60% estimated ejection fraction.   2. Spectral Doppler shows an abnormal pattern of left ventricular diastolic filling.   3. The left atrium is severely dilated.   4. The right atrium is severely dilated.   5. Mild to moderate mitral valve regurgitation.   6. Mild aortic valve stenosis.   7. There is mild aortic valve regurgitation.   8. The patient is in atrial fibrillation which may influence the estimate of left ventricular function and transvalvular flows.    Stress Test:  7/29/2020  IMPRESSION:  1. Normal myocardial perfusion study without evidence of ischemia or  prior infarction.  2. The left ventricle is normal in size.  3. Normal LV wall motion with an LV EF estimated at greater than 50%.      Lab review: I have personally reviewed the laboratory result(s)   Diagnostic review: I have personally reviewed the result(s) of the Echocardiogram and stress     Assessment/Plan   Very pleasant 74 yo male from home with h/o permanent a.fib, dyslipidemia presenting today for pre-operative cardiac evaluation prior to upcoming right hip replacement surgery.      Plan:  -May proceed to the OR with mild risk for perioperative cardiovascular event.   -[id number] score is 2-- recommend starting apixaban 5mg BID post-operatively when safe from an orthopedic standpoint  -Follow up in 1 year or sooner if needed       ANGELINA Velazco-CNP  "

## 2023-11-01 ENCOUNTER — ANESTHESIA EVENT (OUTPATIENT)
Dept: OPERATING ROOM | Facility: HOSPITAL | Age: 73
End: 2023-11-01
Payer: MEDICARE

## 2023-11-01 LAB
PSA SERPL-MCNC: 0.97 NG/ML
VIT B12 SERPL-MCNC: 348 PG/ML (ref 211–911)

## 2023-11-05 PROBLEM — Z96.641 STATUS POST TOTAL HIP REPLACEMENT, RIGHT: Status: ACTIVE | Noted: 2023-11-05

## 2023-11-06 ENCOUNTER — HOME HEALTH ADMISSION (OUTPATIENT)
Dept: HOME HEALTH SERVICES | Facility: HOME HEALTH | Age: 73
End: 2023-11-06
Payer: MEDICARE

## 2023-11-06 ENCOUNTER — HOSPITAL ENCOUNTER (OUTPATIENT)
Facility: HOSPITAL | Age: 73
Discharge: HOME | End: 2023-11-07
Attending: ORTHOPAEDIC SURGERY | Admitting: ORTHOPAEDIC SURGERY
Payer: MEDICARE

## 2023-11-06 ENCOUNTER — HOSPITAL ENCOUNTER (OUTPATIENT)
Dept: CARDIOLOGY | Facility: HOSPITAL | Age: 73
Discharge: HOME | End: 2023-11-06
Payer: MEDICARE

## 2023-11-06 ENCOUNTER — APPOINTMENT (OUTPATIENT)
Dept: RADIOLOGY | Facility: HOSPITAL | Age: 73
End: 2023-11-06
Payer: MEDICARE

## 2023-11-06 ENCOUNTER — ANESTHESIA (OUTPATIENT)
Dept: OPERATING ROOM | Facility: HOSPITAL | Age: 73
End: 2023-11-06
Payer: MEDICARE

## 2023-11-06 DIAGNOSIS — Z96.641 STATUS POST TOTAL HIP REPLACEMENT, RIGHT: Primary | ICD-10-CM

## 2023-11-06 LAB
ABO GROUP (TYPE) IN BLOOD: NORMAL
ANTIBODY SCREEN: NORMAL
RH FACTOR (ANTIGEN D): NORMAL

## 2023-11-06 PROCEDURE — 93005 ELECTROCARDIOGRAM TRACING: CPT

## 2023-11-06 PROCEDURE — 99222 1ST HOSP IP/OBS MODERATE 55: CPT | Performed by: NURSE PRACTITIONER

## 2023-11-06 PROCEDURE — 9420000001 HC RT PATIENT EDUCATION 5 MIN

## 2023-11-06 PROCEDURE — 97110 THERAPEUTIC EXERCISES: CPT | Mod: GP

## 2023-11-06 PROCEDURE — 3600000018 HC OR TIME - INITIAL BASE CHARGE - PROCEDURE LEVEL SIX: Performed by: ORTHOPAEDIC SURGERY

## 2023-11-06 PROCEDURE — 76000 FLUOROSCOPY <1 HR PHYS/QHP: CPT

## 2023-11-06 PROCEDURE — 86850 RBC ANTIBODY SCREEN: CPT

## 2023-11-06 PROCEDURE — 2500000001 HC RX 250 WO HCPCS SELF ADMINISTERED DRUGS (ALT 637 FOR MEDICARE OP)

## 2023-11-06 PROCEDURE — A4216 STERILE WATER/SALINE, 10 ML: HCPCS | Performed by: ORTHOPAEDIC SURGERY

## 2023-11-06 PROCEDURE — 2500000004 HC RX 250 GENERAL PHARMACY W/ HCPCS (ALT 636 FOR OP/ED): Performed by: ANESTHESIOLOGY

## 2023-11-06 PROCEDURE — 72170 X-RAY EXAM OF PELVIS: CPT | Mod: FY

## 2023-11-06 PROCEDURE — 7100000001 HC RECOVERY ROOM TIME - INITIAL BASE CHARGE: Performed by: ORTHOPAEDIC SURGERY

## 2023-11-06 PROCEDURE — 36415 COLL VENOUS BLD VENIPUNCTURE: CPT

## 2023-11-06 PROCEDURE — 2500000004 HC RX 250 GENERAL PHARMACY W/ HCPCS (ALT 636 FOR OP/ED)

## 2023-11-06 PROCEDURE — 97161 PT EVAL LOW COMPLEX 20 MIN: CPT | Mod: GP

## 2023-11-06 PROCEDURE — 3700000002 HC GENERAL ANESTHESIA TIME - EACH INCREMENTAL 1 MINUTE: Performed by: ORTHOPAEDIC SURGERY

## 2023-11-06 PROCEDURE — A27130 PR TOTAL HIP ARTHROPLASTY: Performed by: NURSE ANESTHETIST, CERTIFIED REGISTERED

## 2023-11-06 PROCEDURE — 7100000011 HC EXTENDED STAY RECOVERY HOURLY - NURSING UNIT

## 2023-11-06 PROCEDURE — 2500000004 HC RX 250 GENERAL PHARMACY W/ HCPCS (ALT 636 FOR OP/ED): Performed by: NURSE ANESTHETIST, CERTIFIED REGISTERED

## 2023-11-06 PROCEDURE — 86901 BLOOD TYPING SEROLOGIC RH(D): CPT

## 2023-11-06 PROCEDURE — 93010 ELECTROCARDIOGRAM REPORT: CPT | Performed by: INTERNAL MEDICINE

## 2023-11-06 PROCEDURE — C1776 JOINT DEVICE (IMPLANTABLE): HCPCS | Performed by: ORTHOPAEDIC SURGERY

## 2023-11-06 PROCEDURE — 2780000003 HC OR 278 NO HCPCS: Performed by: ORTHOPAEDIC SURGERY

## 2023-11-06 PROCEDURE — 3600000017 HC OR TIME - EACH INCREMENTAL 1 MINUTE - PROCEDURE LEVEL SIX: Performed by: ORTHOPAEDIC SURGERY

## 2023-11-06 PROCEDURE — 72170 X-RAY EXAM OF PELVIS: CPT | Performed by: RADIOLOGY

## 2023-11-06 PROCEDURE — 2720000007 HC OR 272 NO HCPCS: Performed by: ORTHOPAEDIC SURGERY

## 2023-11-06 PROCEDURE — A6213 FOAM DRG >16<=48 SQ IN W/BDR: HCPCS | Performed by: ORTHOPAEDIC SURGERY

## 2023-11-06 PROCEDURE — 7100000002 HC RECOVERY ROOM TIME - EACH INCREMENTAL 1 MINUTE: Performed by: ORTHOPAEDIC SURGERY

## 2023-11-06 PROCEDURE — 96372 THER/PROPH/DIAG INJ SC/IM: CPT | Performed by: ORTHOPAEDIC SURGERY

## 2023-11-06 PROCEDURE — 2500000005 HC RX 250 GENERAL PHARMACY W/O HCPCS: Performed by: NURSE ANESTHETIST, CERTIFIED REGISTERED

## 2023-11-06 PROCEDURE — 2500000004 HC RX 250 GENERAL PHARMACY W/ HCPCS (ALT 636 FOR OP/ED): Performed by: ORTHOPAEDIC SURGERY

## 2023-11-06 PROCEDURE — 3700000001 HC GENERAL ANESTHESIA TIME - INITIAL BASE CHARGE: Performed by: ORTHOPAEDIC SURGERY

## 2023-11-06 DEVICE — IMPLANTABLE DEVICE: Type: IMPLANTABLE DEVICE | Site: HIP | Status: FUNCTIONAL

## 2023-11-06 DEVICE — SHELL, TRIDENT II, CLUSTERHOLE, 56F: Type: IMPLANTABLE DEVICE | Site: HIP | Status: FUNCTIONAL

## 2023-11-06 DEVICE — INSERT, TRIDENT X3 POLYETHYLENE, 0 DEG, 36MM F: Type: IMPLANTABLE DEVICE | Site: HIP | Status: FUNCTIONAL

## 2023-11-06 DEVICE — HEAD, FEMUR V40 36MM 0MM BIOLOX DELTA: Type: IMPLANTABLE DEVICE | Site: HIP | Status: FUNCTIONAL

## 2023-11-06 RX ORDER — CEFAZOLIN SODIUM 2 G/100ML
2 INJECTION, SOLUTION INTRAVENOUS EVERY 8 HOURS
Status: COMPLETED | OUTPATIENT
Start: 2023-11-06 | End: 2023-11-07

## 2023-11-06 RX ORDER — ROPIVACAINE HYDROCHLORIDE 5 MG/ML
INJECTION, SOLUTION EPIDURAL; INFILTRATION; PERINEURAL AS NEEDED
Status: DISCONTINUED | OUTPATIENT
Start: 2023-11-06 | End: 2023-11-06 | Stop reason: HOSPADM

## 2023-11-06 RX ORDER — ASPIRIN 325 MG
325 TABLET, DELAYED RELEASE (ENTERIC COATED) ORAL 2 TIMES DAILY
Status: SHIPPED | OUTPATIENT
Start: 2023-11-06 | End: 2023-12-06

## 2023-11-06 RX ORDER — SODIUM CHLORIDE, SODIUM LACTATE, POTASSIUM CHLORIDE, CALCIUM CHLORIDE 600; 310; 30; 20 MG/100ML; MG/100ML; MG/100ML; MG/100ML
100 INJECTION, SOLUTION INTRAVENOUS CONTINUOUS
Status: DISCONTINUED | OUTPATIENT
Start: 2023-11-06 | End: 2023-11-06

## 2023-11-06 RX ORDER — NALOXONE HYDROCHLORIDE 0.4 MG/ML
0.2 INJECTION, SOLUTION INTRAMUSCULAR; INTRAVENOUS; SUBCUTANEOUS EVERY 5 MIN PRN
Status: DISCONTINUED | OUTPATIENT
Start: 2023-11-06 | End: 2023-11-07 | Stop reason: HOSPADM

## 2023-11-06 RX ORDER — MORPHINE SULFATE 2 MG/ML
2 INJECTION, SOLUTION INTRAMUSCULAR; INTRAVENOUS EVERY 2 HOUR PRN
Status: DISCONTINUED | OUTPATIENT
Start: 2023-11-06 | End: 2023-11-07 | Stop reason: HOSPADM

## 2023-11-06 RX ORDER — GABAPENTIN 300 MG/1
300 CAPSULE ORAL 3 TIMES DAILY PRN
Qty: 42 CAPSULE | Refills: 0 | COMMUNITY
Start: 2023-11-06 | End: 2024-01-04 | Stop reason: ALTCHOICE

## 2023-11-06 RX ORDER — SODIUM CHLORIDE, SODIUM LACTATE, POTASSIUM CHLORIDE, CALCIUM CHLORIDE 600; 310; 30; 20 MG/100ML; MG/100ML; MG/100ML; MG/100ML
100 INJECTION, SOLUTION INTRAVENOUS CONTINUOUS
Status: DISCONTINUED | OUTPATIENT
Start: 2023-11-06 | End: 2023-11-07 | Stop reason: HOSPADM

## 2023-11-06 RX ORDER — CEFAZOLIN SODIUM 2 G/50ML
2 SOLUTION INTRAVENOUS ONCE
Status: DISCONTINUED | OUTPATIENT
Start: 2023-11-06 | End: 2023-11-06 | Stop reason: HOSPADM

## 2023-11-06 RX ORDER — IPRATROPIUM BROMIDE AND ALBUTEROL SULFATE 2.5; .5 MG/3ML; MG/3ML
3 SOLUTION RESPIRATORY (INHALATION) ONCE
Status: DISCONTINUED | OUTPATIENT
Start: 2023-11-06 | End: 2023-11-06 | Stop reason: HOSPADM

## 2023-11-06 RX ORDER — CELECOXIB 100 MG/1
200 CAPSULE ORAL 2 TIMES DAILY PRN
Status: DISCONTINUED | OUTPATIENT
Start: 2023-11-06 | End: 2023-11-07 | Stop reason: HOSPADM

## 2023-11-06 RX ORDER — ASCORBIC ACID 500 MG
500 TABLET ORAL 2 TIMES DAILY
Qty: 60 TABLET | Refills: 0 | COMMUNITY
Start: 2023-11-06 | End: 2023-11-06 | Stop reason: ALTCHOICE

## 2023-11-06 RX ORDER — ONDANSETRON 4 MG/1
4 TABLET, FILM COATED ORAL EVERY 8 HOURS PRN
Status: DISCONTINUED | OUTPATIENT
Start: 2023-11-06 | End: 2023-11-07 | Stop reason: HOSPADM

## 2023-11-06 RX ORDER — PROPOFOL 10 MG/ML
INJECTION, EMULSION INTRAVENOUS AS NEEDED
Status: DISCONTINUED | OUTPATIENT
Start: 2023-11-06 | End: 2023-11-06

## 2023-11-06 RX ORDER — SODIUM CHLORIDE 9 MG/ML
INJECTION, SOLUTION INTRAMUSCULAR; INTRAVENOUS; SUBCUTANEOUS AS NEEDED
Status: DISCONTINUED | OUTPATIENT
Start: 2023-11-06 | End: 2023-11-06 | Stop reason: HOSPADM

## 2023-11-06 RX ORDER — ASPIRIN 325 MG
325 TABLET, DELAYED RELEASE (ENTERIC COATED) ORAL 2 TIMES DAILY
Status: DISCONTINUED | OUTPATIENT
Start: 2023-11-06 | End: 2023-11-07 | Stop reason: HOSPADM

## 2023-11-06 RX ORDER — ROSUVASTATIN CALCIUM 20 MG/1
40 TABLET, COATED ORAL DAILY
Status: DISCONTINUED | OUTPATIENT
Start: 2023-11-06 | End: 2023-11-07 | Stop reason: HOSPADM

## 2023-11-06 RX ORDER — FENTANYL CITRATE 50 UG/ML
INJECTION, SOLUTION INTRAMUSCULAR; INTRAVENOUS AS NEEDED
Status: DISCONTINUED | OUTPATIENT
Start: 2023-11-06 | End: 2023-11-06

## 2023-11-06 RX ORDER — TRANEXAMIC ACID 100 MG/ML
INJECTION, SOLUTION INTRAVENOUS AS NEEDED
Status: DISCONTINUED | OUTPATIENT
Start: 2023-11-06 | End: 2023-11-06

## 2023-11-06 RX ORDER — DOCUSATE SODIUM 100 MG/1
100 CAPSULE, LIQUID FILLED ORAL 2 TIMES DAILY
Status: DISCONTINUED | OUTPATIENT
Start: 2023-11-06 | End: 2023-11-07 | Stop reason: HOSPADM

## 2023-11-06 RX ORDER — VASOPRESSIN 20 U/ML
INJECTION PARENTERAL AS NEEDED
Status: DISCONTINUED | OUTPATIENT
Start: 2023-11-06 | End: 2023-11-06

## 2023-11-06 RX ORDER — HYDROMORPHONE HYDROCHLORIDE 2 MG/ML
INJECTION, SOLUTION INTRAMUSCULAR; INTRAVENOUS; SUBCUTANEOUS AS NEEDED
Status: DISCONTINUED | OUTPATIENT
Start: 2023-11-06 | End: 2023-11-06

## 2023-11-06 RX ORDER — CEFAZOLIN 1 G/1
INJECTION, POWDER, FOR SOLUTION INTRAVENOUS AS NEEDED
Status: DISCONTINUED | OUTPATIENT
Start: 2023-11-06 | End: 2023-11-06

## 2023-11-06 RX ORDER — CARISOPRODOL 350 MG/1
350 TABLET ORAL 3 TIMES DAILY PRN
Status: DISCONTINUED | OUTPATIENT
Start: 2023-11-06 | End: 2023-11-07 | Stop reason: HOSPADM

## 2023-11-06 RX ORDER — CELECOXIB 200 MG/1
200 CAPSULE ORAL 2 TIMES DAILY
Qty: 60 CAPSULE | Refills: 0 | COMMUNITY
Start: 2023-11-06 | End: 2024-01-04 | Stop reason: ALTCHOICE

## 2023-11-06 RX ORDER — MIDAZOLAM HYDROCHLORIDE 1 MG/ML
INJECTION INTRAMUSCULAR; INTRAVENOUS AS NEEDED
Status: DISCONTINUED | OUTPATIENT
Start: 2023-11-06 | End: 2023-11-06

## 2023-11-06 RX ORDER — TRAMADOL HYDROCHLORIDE 50 MG/1
50 TABLET ORAL EVERY 6 HOURS PRN
Status: DISCONTINUED | OUTPATIENT
Start: 2023-11-06 | End: 2023-11-07 | Stop reason: HOSPADM

## 2023-11-06 RX ORDER — LIDOCAINE HYDROCHLORIDE 10 MG/ML
INJECTION INFILTRATION; PERINEURAL AS NEEDED
Status: DISCONTINUED | OUTPATIENT
Start: 2023-11-06 | End: 2023-11-06

## 2023-11-06 RX ORDER — ROCURONIUM BROMIDE 10 MG/ML
INJECTION, SOLUTION INTRAVENOUS AS NEEDED
Status: DISCONTINUED | OUTPATIENT
Start: 2023-11-06 | End: 2023-11-06

## 2023-11-06 RX ORDER — ONDANSETRON HYDROCHLORIDE 2 MG/ML
4 INJECTION, SOLUTION INTRAVENOUS ONCE AS NEEDED
Status: DISCONTINUED | OUTPATIENT
Start: 2023-11-06 | End: 2023-11-06 | Stop reason: HOSPADM

## 2023-11-06 RX ORDER — CARISOPRODOL 350 MG/1
350 TABLET ORAL 3 TIMES DAILY PRN
Qty: 42 TABLET | Refills: 0 | COMMUNITY
Start: 2023-11-06 | End: 2024-01-04 | Stop reason: ALTCHOICE

## 2023-11-06 RX ORDER — MORPHINE SULFATE 0.5 MG/ML
INJECTION, SOLUTION EPIDURAL; INTRATHECAL; INTRAVENOUS AS NEEDED
Status: DISCONTINUED | OUTPATIENT
Start: 2023-11-06 | End: 2023-11-06 | Stop reason: HOSPADM

## 2023-11-06 RX ORDER — KETOROLAC TROMETHAMINE 30 MG/ML
INJECTION, SOLUTION INTRAMUSCULAR; INTRAVENOUS AS NEEDED
Status: DISCONTINUED | OUTPATIENT
Start: 2023-11-06 | End: 2023-11-06 | Stop reason: HOSPADM

## 2023-11-06 RX ORDER — OXYCODONE AND ACETAMINOPHEN 5; 325 MG/1; MG/1
1 TABLET ORAL EVERY 6 HOURS PRN
Qty: 28 TABLET | Refills: 0 | COMMUNITY
Start: 2023-11-06 | End: 2024-01-04 | Stop reason: ALTCHOICE

## 2023-11-06 RX ORDER — ACETAMINOPHEN 325 MG/1
995 TABLET ORAL EVERY 6 HOURS
Status: DISCONTINUED | OUTPATIENT
Start: 2023-11-06 | End: 2023-11-07 | Stop reason: HOSPADM

## 2023-11-06 RX ORDER — SODIUM CHLORIDE, SODIUM LACTATE, POTASSIUM CHLORIDE, CALCIUM CHLORIDE 600; 310; 30; 20 MG/100ML; MG/100ML; MG/100ML; MG/100ML
100 INJECTION, SOLUTION INTRAVENOUS CONTINUOUS
Status: DISCONTINUED | OUTPATIENT
Start: 2023-11-06 | End: 2023-11-06 | Stop reason: HOSPADM

## 2023-11-06 RX ORDER — ONDANSETRON HYDROCHLORIDE 2 MG/ML
INJECTION, SOLUTION INTRAVENOUS AS NEEDED
Status: DISCONTINUED | OUTPATIENT
Start: 2023-11-06 | End: 2023-11-06

## 2023-11-06 RX ORDER — EPINEPHRINE 1 MG/ML
INJECTION, SOLUTION, CONCENTRATE INTRAVENOUS AS NEEDED
Status: DISCONTINUED | OUTPATIENT
Start: 2023-11-06 | End: 2023-11-06 | Stop reason: HOSPADM

## 2023-11-06 RX ORDER — DOCUSATE SODIUM 100 MG/1
100 CAPSULE, LIQUID FILLED ORAL 2 TIMES DAILY PRN
Qty: 60 CAPSULE | Refills: 0 | COMMUNITY
Start: 2023-11-06 | End: 2024-01-04 | Stop reason: ALTCHOICE

## 2023-11-06 RX ORDER — PHENYLEPHRINE HCL IN 0.9% NACL 0.4MG/10ML
SYRINGE (ML) INTRAVENOUS AS NEEDED
Status: DISCONTINUED | OUTPATIENT
Start: 2023-11-06 | End: 2023-11-06

## 2023-11-06 RX ORDER — DEXAMETHASONE SODIUM PHOSPHATE 4 MG/ML
INJECTION, SOLUTION INTRA-ARTICULAR; INTRALESIONAL; INTRAMUSCULAR; INTRAVENOUS; SOFT TISSUE AS NEEDED
Status: DISCONTINUED | OUTPATIENT
Start: 2023-11-06 | End: 2023-11-06

## 2023-11-06 RX ORDER — ONDANSETRON HYDROCHLORIDE 2 MG/ML
4 INJECTION, SOLUTION INTRAVENOUS EVERY 8 HOURS PRN
Status: DISCONTINUED | OUTPATIENT
Start: 2023-11-06 | End: 2023-11-07 | Stop reason: HOSPADM

## 2023-11-06 RX ORDER — DIPHENHYDRAMINE HYDROCHLORIDE 50 MG/ML
25 INJECTION INTRAMUSCULAR; INTRAVENOUS AS NEEDED
Status: DISCONTINUED | OUTPATIENT
Start: 2023-11-06 | End: 2023-11-07 | Stop reason: HOSPADM

## 2023-11-06 RX ORDER — DROPERIDOL 2.5 MG/ML
0.62 INJECTION, SOLUTION INTRAMUSCULAR; INTRAVENOUS ONCE AS NEEDED
Status: DISCONTINUED | OUTPATIENT
Start: 2023-11-06 | End: 2023-11-06 | Stop reason: HOSPADM

## 2023-11-06 RX ORDER — OXYCODONE HYDROCHLORIDE 5 MG/1
5 TABLET ORAL EVERY 6 HOURS PRN
Status: DISCONTINUED | OUTPATIENT
Start: 2023-11-06 | End: 2023-11-07 | Stop reason: HOSPADM

## 2023-11-06 RX ORDER — GABAPENTIN 300 MG/1
300 CAPSULE ORAL 3 TIMES DAILY PRN
Status: DISCONTINUED | OUTPATIENT
Start: 2023-11-06 | End: 2023-11-07 | Stop reason: HOSPADM

## 2023-11-06 RX ORDER — KETOROLAC TROMETHAMINE 15 MG/ML
15 INJECTION, SOLUTION INTRAMUSCULAR; INTRAVENOUS EVERY 6 HOURS
Status: DISCONTINUED | OUTPATIENT
Start: 2023-11-06 | End: 2023-11-07 | Stop reason: HOSPADM

## 2023-11-06 RX ORDER — OXYCODONE HYDROCHLORIDE 10 MG/1
10 TABLET ORAL EVERY 6 HOURS PRN
Status: DISCONTINUED | OUTPATIENT
Start: 2023-11-06 | End: 2023-11-07 | Stop reason: HOSPADM

## 2023-11-06 RX ADMIN — CEFAZOLIN SODIUM 2 G: 2 INJECTION, SOLUTION INTRAVENOUS at 16:35

## 2023-11-06 RX ADMIN — HYDROMORPHONE HYDROCHLORIDE 0.5 MG: 2 INJECTION, SOLUTION INTRAMUSCULAR; INTRAVENOUS; SUBCUTANEOUS at 11:38

## 2023-11-06 RX ADMIN — ASPIRIN 325 MG: 325 TABLET, COATED ORAL at 20:01

## 2023-11-06 RX ADMIN — Medication 80 MCG: at 09:50

## 2023-11-06 RX ADMIN — LIDOCAINE HYDROCHLORIDE 50 MG: 10 INJECTION, SOLUTION INFILTRATION; PERINEURAL at 09:36

## 2023-11-06 RX ADMIN — ROCURONIUM BROMIDE 15 MG: 10 INJECTION, SOLUTION INTRAVENOUS at 10:19

## 2023-11-06 RX ADMIN — SODIUM CHLORIDE, POTASSIUM CHLORIDE, SODIUM LACTATE AND CALCIUM CHLORIDE: 600; 310; 30; 20 INJECTION, SOLUTION INTRAVENOUS at 08:46

## 2023-11-06 RX ADMIN — SODIUM CHLORIDE, POTASSIUM CHLORIDE, SODIUM LACTATE AND CALCIUM CHLORIDE: 600; 310; 30; 20 INJECTION, SOLUTION INTRAVENOUS at 10:24

## 2023-11-06 RX ADMIN — SODIUM CHLORIDE, POTASSIUM CHLORIDE, SODIUM LACTATE AND CALCIUM CHLORIDE 100 ML/HR: 600; 310; 30; 20 INJECTION, SOLUTION INTRAVENOUS at 23:13

## 2023-11-06 RX ADMIN — VASOPRESSIN 2 UNITS: 20 INJECTION INTRAVENOUS at 10:14

## 2023-11-06 RX ADMIN — ONDANSETRON 4 MG: 2 INJECTION INTRAMUSCULAR; INTRAVENOUS at 11:20

## 2023-11-06 RX ADMIN — DEXAMETHASONE SODIUM PHOSPHATE 4 MG: 4 INJECTION INTRA-ARTICULAR; INTRALESIONAL; INTRAMUSCULAR; INTRAVENOUS; SOFT TISSUE at 10:17

## 2023-11-06 RX ADMIN — PROPOFOL 150 MG: 10 INJECTION, EMULSION INTRAVENOUS at 09:36

## 2023-11-06 RX ADMIN — KETOROLAC TROMETHAMINE 30 MG: 30 INJECTION, SOLUTION INTRAMUSCULAR at 11:20

## 2023-11-06 RX ADMIN — FENTANYL CITRATE 25 MCG: 50 INJECTION, SOLUTION INTRAMUSCULAR; INTRAVENOUS at 10:31

## 2023-11-06 RX ADMIN — VASOPRESSIN 2 UNITS: 20 INJECTION INTRAVENOUS at 11:28

## 2023-11-06 RX ADMIN — VASOPRESSIN 2 UNITS: 20 INJECTION INTRAVENOUS at 10:08

## 2023-11-06 RX ADMIN — FENTANYL CITRATE 75 MCG: 50 INJECTION, SOLUTION INTRAMUSCULAR; INTRAVENOUS at 09:36

## 2023-11-06 RX ADMIN — MIDAZOLAM HYDROCHLORIDE 2 MG: 1 INJECTION, SOLUTION INTRAMUSCULAR; INTRAVENOUS at 09:36

## 2023-11-06 RX ADMIN — TRANEXAMIC ACID 1000 MG: 100 INJECTION, SOLUTION INTRAVENOUS at 09:52

## 2023-11-06 RX ADMIN — ROCURONIUM BROMIDE 10 MG: 10 INJECTION, SOLUTION INTRAVENOUS at 10:59

## 2023-11-06 RX ADMIN — CEFAZOLIN 2 G: 1 INJECTION, POWDER, FOR SOLUTION INTRAMUSCULAR; INTRAVENOUS at 09:48

## 2023-11-06 RX ADMIN — HYDROMORPHONE HYDROCHLORIDE 0.5 MG: 2 INJECTION, SOLUTION INTRAMUSCULAR; INTRAVENOUS; SUBCUTANEOUS at 11:01

## 2023-11-06 RX ADMIN — ONDANSETRON 4 MG: 2 INJECTION INTRAMUSCULAR; INTRAVENOUS at 10:17

## 2023-11-06 RX ADMIN — VASOPRESSIN 2 UNITS: 20 INJECTION INTRAVENOUS at 10:01

## 2023-11-06 RX ADMIN — ROCURONIUM BROMIDE 50 MG: 10 INJECTION, SOLUTION INTRAVENOUS at 09:36

## 2023-11-06 RX ADMIN — SODIUM CHLORIDE, POTASSIUM CHLORIDE, SODIUM LACTATE AND CALCIUM CHLORIDE 100 ML/HR: 600; 310; 30; 20 INJECTION, SOLUTION INTRAVENOUS at 15:11

## 2023-11-06 RX ADMIN — Medication 80 MCG: at 09:57

## 2023-11-06 RX ADMIN — Medication 80 MCG: at 10:25

## 2023-11-06 RX ADMIN — SUGAMMADEX 200 MG: 100 INJECTION, SOLUTION INTRAVENOUS at 11:42

## 2023-11-06 SDOH — SOCIAL STABILITY: SOCIAL INSECURITY: WERE YOU ABLE TO COMPLETE ALL THE BEHAVIORAL HEALTH SCREENINGS?: YES

## 2023-11-06 SDOH — SOCIAL STABILITY: SOCIAL INSECURITY: HAS ANYONE EVER THREATENED TO HURT YOUR FAMILY OR YOUR PETS?: NO

## 2023-11-06 SDOH — SOCIAL STABILITY: SOCIAL INSECURITY: ABUSE: ADULT

## 2023-11-06 SDOH — SOCIAL STABILITY: SOCIAL INSECURITY: ARE YOU OR HAVE YOU BEEN THREATENED OR ABUSED PHYSICALLY, EMOTIONALLY, OR SEXUALLY BY ANYONE?: NO

## 2023-11-06 SDOH — SOCIAL STABILITY: SOCIAL INSECURITY: DO YOU FEEL ANYONE HAS EXPLOITED OR TAKEN ADVANTAGE OF YOU FINANCIALLY OR OF YOUR PERSONAL PROPERTY?: NO

## 2023-11-06 SDOH — SOCIAL STABILITY: SOCIAL INSECURITY: DO YOU FEEL UNSAFE GOING BACK TO THE PLACE WHERE YOU ARE LIVING?: NO

## 2023-11-06 SDOH — SOCIAL STABILITY: SOCIAL INSECURITY: HAVE YOU HAD THOUGHTS OF HARMING ANYONE ELSE?: NO

## 2023-11-06 SDOH — SOCIAL STABILITY: SOCIAL INSECURITY: DOES ANYONE TRY TO KEEP YOU FROM HAVING/CONTACTING OTHER FRIENDS OR DOING THINGS OUTSIDE YOUR HOME?: NO

## 2023-11-06 SDOH — SOCIAL STABILITY: SOCIAL INSECURITY: ARE THERE ANY APPARENT SIGNS OF INJURIES/BEHAVIORS THAT COULD BE RELATED TO ABUSE/NEGLECT?: NO

## 2023-11-06 ASSESSMENT — COGNITIVE AND FUNCTIONAL STATUS - GENERAL
TURNING FROM BACK TO SIDE WHILE IN FLAT BAD: A LITTLE
MOBILITY SCORE: 18
MOVING FROM LYING ON BACK TO SITTING ON SIDE OF FLAT BED WITH BEDRAILS: A LITTLE
HELP NEEDED FOR BATHING: A LITTLE
HELP NEEDED FOR BATHING: A LITTLE
WALKING IN HOSPITAL ROOM: A LITTLE
MOVING FROM LYING ON BACK TO SITTING ON SIDE OF FLAT BED WITH BEDRAILS: A LITTLE
STANDING UP FROM CHAIR USING ARMS: A LITTLE
TURNING FROM BACK TO SIDE WHILE IN FLAT BAD: A LITTLE
MOVING TO AND FROM BED TO CHAIR: A LITTLE
WALKING IN HOSPITAL ROOM: A LITTLE
STANDING UP FROM CHAIR USING ARMS: A LITTLE
MOVING FROM LYING ON BACK TO SITTING ON SIDE OF FLAT BED WITH BEDRAILS: A LITTLE
DAILY ACTIVITIY SCORE: 22
CLIMB 3 TO 5 STEPS WITH RAILING: A LOT
STANDING UP FROM CHAIR USING ARMS: A LITTLE
MOVING TO AND FROM BED TO CHAIR: A LITTLE
WALKING IN HOSPITAL ROOM: A LITTLE
DAILY ACTIVITIY SCORE: 22
DRESSING REGULAR LOWER BODY CLOTHING: A LITTLE
PATIENT BASELINE BEDBOUND: NO
MOBILITY SCORE: 17
MOBILITY SCORE: 18
DRESSING REGULAR LOWER BODY CLOTHING: A LITTLE
CLIMB 3 TO 5 STEPS WITH RAILING: A LITTLE
TURNING FROM BACK TO SIDE WHILE IN FLAT BAD: A LITTLE
MOVING TO AND FROM BED TO CHAIR: A LITTLE
CLIMB 3 TO 5 STEPS WITH RAILING: A LITTLE

## 2023-11-06 ASSESSMENT — LIFESTYLE VARIABLES
AUDIT-C TOTAL SCORE: 0
HOW OFTEN DO YOU HAVE A DRINK CONTAINING ALCOHOL: NEVER
AUDIT-C TOTAL SCORE: 0
HOW MANY STANDARD DRINKS CONTAINING ALCOHOL DO YOU HAVE ON A TYPICAL DAY: PATIENT DOES NOT DRINK
HOW OFTEN DO YOU HAVE 6 OR MORE DRINKS ON ONE OCCASION: NEVER
SKIP TO QUESTIONS 9-10: 1

## 2023-11-06 ASSESSMENT — COLUMBIA-SUICIDE SEVERITY RATING SCALE - C-SSRS
2. HAVE YOU ACTUALLY HAD ANY THOUGHTS OF KILLING YOURSELF?: NO
1. IN THE PAST MONTH, HAVE YOU WISHED YOU WERE DEAD OR WISHED YOU COULD GO TO SLEEP AND NOT WAKE UP?: NO
6. HAVE YOU EVER DONE ANYTHING, STARTED TO DO ANYTHING, OR PREPARED TO DO ANYTHING TO END YOUR LIFE?: NO

## 2023-11-06 ASSESSMENT — ACTIVITIES OF DAILY LIVING (ADL)
LACK_OF_TRANSPORTATION: NO
FEEDING YOURSELF: INDEPENDENT
FEEDING YOURSELF: INDEPENDENT
HEARING - RIGHT EAR: FUNCTIONAL
ADL_ASSISTANCE: INDEPENDENT
ADEQUATE_TO_COMPLETE_ADL: YES
ADLS_ADDRESSED: YES
BATHING: INDEPENDENT
HEARING - LEFT EAR: FUNCTIONAL
DRESSING YOURSELF: INDEPENDENT
ADEQUATE_TO_COMPLETE_ADL: YES
PATIENT'S MEMORY ADEQUATE TO SAFELY COMPLETE DAILY ACTIVITIES?: YES
GROOMING: INDEPENDENT
HEARING - RIGHT EAR: FUNCTIONAL
JUDGMENT_ADEQUATE_SAFELY_COMPLETE_DAILY_ACTIVITIES: YES
WALKS IN HOME: INDEPENDENT
TOILETING: INDEPENDENT
HEARING - LEFT EAR: FUNCTIONAL
WALKS IN HOME: INDEPENDENT
PATIENT'S MEMORY ADEQUATE TO SAFELY COMPLETE DAILY ACTIVITIES?: YES
TOILETING: INDEPENDENT
DRESSING YOURSELF: INDEPENDENT
GROOMING: INDEPENDENT
BATHING: INDEPENDENT

## 2023-11-06 ASSESSMENT — PAIN - FUNCTIONAL ASSESSMENT
PAIN_FUNCTIONAL_ASSESSMENT: 0-10
PAIN_FUNCTIONAL_ASSESSMENT: 0-10

## 2023-11-06 ASSESSMENT — PAIN SCALES - GENERAL
PAINLEVEL_OUTOF10: 1
PAIN_LEVEL: 2
PAINLEVEL_OUTOF10: 0 - NO PAIN
PAINLEVEL_OUTOF10: 0 - NO PAIN

## 2023-11-06 ASSESSMENT — PATIENT HEALTH QUESTIONNAIRE - PHQ9
2. FEELING DOWN, DEPRESSED OR HOPELESS: NOT AT ALL
1. LITTLE INTEREST OR PLEASURE IN DOING THINGS: NOT AT ALL
SUM OF ALL RESPONSES TO PHQ9 QUESTIONS 1 & 2: 0

## 2023-11-06 NOTE — CONSULTS
Inpatient consult to Medicine  Consult performed by: ANGELINA Zamora-CNP  Consult ordered by: Sukumar Palma PA-C  Reason for consult: Medical Management          Reason For Consult  Medical Management    History Of Present Illness  Ok Chris is a 73 y.o. male with a medical history of atrial fibrillation, hyperlipidemia who presented to [facility] for an elective right total hip arthroplasty for osteoarthritis. States ongoing pain in his hip. Denies injury or trauma. Operative course uneventful to this point. EBL 100ml. Consulted for medical management. Seen and examined in his room this PM. Awake and alert. Comfortable up in recliner chair. He denies chest pain, breathing difficulties, abdominal pain, N/V/D/C, fever, or chills.       Past Medical History  Permanent A.Fib  Hyperlipidemia  Osteoarthritis  Traumatic Brain Injury/Concussion   Thoracic Aortic Aneurysm (patient unaware of this finding till this AM)    Surgical History  Tonsillectomy  Vasectomy  Left Total Hip Arthroplasty  Cataracts/Implants  Hernia Repair     Social History  He reports that he has never smoked. He has never used smokeless tobacco. He reports that he does not currently use alcohol. He reports current drug use. Drug: Marijuana.    Family History  Family History   Problem Relation Name Age of Onset    Other (CARDIAC DISORDER) Mother      Heart failure Mother      Cancer Brother      Melanoma Brother      Heart attack Brother          Allergies  Bee venom protein (honey bee)    Review of Systems  Constitutional: Denies fever, chills, fatigue, weight loss/gain  HEENT: Denies ear ache, sore throat, nasal drainage  Eyes: Denies blurred or double vision  Respiratory: Denies cough, shortness or breath, wheezing  Cardiovascular: Denies chest pain, palpitations, shortness of breath with exertion, edema  GI: Denies abdominal pain, nausea, vomiting, diarrhea, constipation, bloody stools  : Denies urinary burning, urgency, frequency,  hematuria  Musculoskeletal: See HPI  Endocrine: Denies cold/heat intolerance, excessive thirst, excessive hunger  Neuro: Denies dizziness, lightheadedness, seizures, headaches  Psychiatric: Denies anxiety, depression, self-harm  Skin: Denies wounds, rashes, lesions  Hematologic: Denies easy bruising, easy bleeding, clotting disorder     Physical Exam  Constitutional: A&O x 3; NAD; calm and cooperative  Eyes: EOM's intact  HEENT: Normocephalic, Atraumatic. Oral mucosa moist.   Neck: Supple. No JVD, lymphadenopathy.   Lungs: CTAB with fair air movement. Respirations even and unlabored on room air.   Heart: RRR  Abdomen: Soft, non-tender, non-distended, +BS  MS/Extremities: VILLA with RLE pain/weakness; right hip surgical incision is covered with silver dressing - C/D/I. No edema. Peripheral pulses intact bilaterally. D/P reflex intact.   Neuro: A&O x3; no focal deficits; gross motor and sensation intact.   Skin: Warm and dry. No rashes or lesions  Psych: Normal affect.       Last Recorded Vitals  /67 (BP Location: Right arm, Patient Position: Lying)   Pulse 62   Temp 36.3 °C (97.3 °F) (Skin)   Resp 18   Wt 83 kg (182 lb 15.7 oz)   SpO2 99%     Relevant Results  Scheduled medications  acetaminophen, 975 mg, oral, q6h  aspirin, 325 mg, oral, BID  ceFAZolin, 2 g, intravenous, q8h  docusate sodium, 100 mg, oral, BID  ketorolac, 15 mg, intravenous, q6h  rosuvastatin, 40 mg, oral, Daily  tranexamic acid, 10 mg/kg, intravenous, Once      Continuous medications  lactated Ringer's, 100 mL/hr, Last Rate: Stopped (11/06/23 1147)  lactated Ringer's, 100 mL/hr  lactated Ringer's, 100 mL/hr, Last Rate: 100 mL/hr (11/06/23 1511)  oxygen, 2 L/min, Last Rate: Stopped (11/06/23 1400)      PRN medications  PRN medications: carisoprodol, celecoxib, diphenhydrAMINE, gabapentin, morphine, naloxone, ondansetron **OR** ondansetron, oxyCODONE, oxyCODONE, traMADol      Assessment/Plan   73 y.o. male with a medical history of atrial  fibrillation, hyperlipidemia who presented to [facility] for an elective right total hip arthroplasty for osteoarthritis. Operative course uneventful to this point. EBL 100ml. Consulted for medical management.    CV: A.Fib   -Not on rate controlling agent of OAC  -Per cardiology and PCP notes was advised to start on Apixaban but never did start  -Recommended to start 5mg BID postoperatively when cleared by orthopedics  -Will monitor overnight on telemetry    Right Hip Osteoarthritis  -S/p right total hip arthroplasty by Dr. Buck today  -Incisional care, antibiotics, activity restrictions per orthopedics surgery.  -PT/OT to follow. WBAT.   -Medicate for pain  -Maintain bowel regimen  -Advised IS use, mobilization.   -Monitor H&H for ABLA. EBL 100ml. CBC in AM.   -DVT prophylaxis per surgery:     Hyperlipidemia  -On statin therapy    DVT Prophylaxis  -Defer to surgery    Disposition  -Plan of care discussed with staff  -Discharge per orthopedics.   -> 60 minutes spent in coordination of care, including physical examination, review of chart, and discussion with pertinent staff.      Thank you for the consult. Will follow. Please message via secure chat for medical issues.       ANGELINA Zamora-CNP

## 2023-11-06 NOTE — DISCHARGE INSTRUCTIONS
FU at [facility] on 11/21/2023 at **. Leave silver bandage on until then. Able to shower over top bandage. Weight bearing as tolerated. No hip flexor activites for 6 weeks.       [facility]                            Bebeto Buck D.O.  Phone: [phone]    POSTOPERATIVE INSTRUCTIONS: TOTAL HIP & TOTAL KNEE ARTHROPLASTY    General/highlights: Flex/tighten the muscles in the buttocks, thighs and calves often when in chair or bed.  Utilize the incentive spirometer often especially the next 3 days.  Walk at least 10 steps every hour that you are awake.  Take stairs 1 at a time, good leg leads up, bed leg legs down, use the handrails.  You may be weightbearing as tolerated, in general the walker is used for 2 weeks.    PAIN, SWELLING & BRUISING  Some pain, stiffness and swelling is normal for up to 1 year after surgery.  Pain will start to let up over time depending on your activity level.  It is preferable to rest for 24 hours following your surgery  Pain is often delayed for 24-48 hours after surgery.  Pain may be dull/achy, throbbing, or even sharp/nerve sensations  It is normal for swelling and bruising to worsen before it gets better.  These symptoms usually peak 1 week after surgery  Swelling and bruising may appear throughout the leg, all the way down to your toes  Wear your compression stockings every day during the day for 14 days and remove them at night.  This will help control swelling    WOUND CARE INSTRUCTIONS  Your surgical bandage will be removed 2 weeks after surgery at your post-operative visit.  If your bandage becomes compromised, begins to come off before then, or soaks through with drainage call the office immediately.  You may have sutures under the skin that dissolve on their own over time.    As the sutures absorb occasionally a small suture abscess can develop, this is not uncommon for up to 6 weeks, if this occurs please notify your surgeon  immediately.    HYGIENE  You may shower 24 hours after your surgery, provided the Mepilex silver dressing is in place.  No tub bathing or submerging underwater.  Do not scrub directly over the surgical bandage  Do not use any creams, lotions or ointments on the surgical leg for 4 weeks after surgery, or until you have been cleared to do so by your surgeon    GENERAL INSTRUCTIONS  Do not drink alcoholic beverages (beer and wine included) for 24 hours following your surgery, or while you are taking narcotic pain medications  Delay making important decisions until you are fully recovered  You cannot swim or submerge in water for at least 6 weeks after surgery, or until you are cleared by your surgeon.  You may start kneeling 3 months after knee replacement surgery, once you have been cleared by your surgeon.  This may not ever feel “normal” or comfortable    HOME DIET  Resume your normal diet after surgery. If you are on a specific type of diet for your condition, resume that instead.    Choose foods that help promote good bowel habits and prevent constipation, such as foods high in fiber.          POSTOPERATIVE MEDICATIONS    Pain medications have been ordered to help manage pain throughout recovery.  While you are using narcotic pain medication, you should be using a stool softener or laxative to prevent constipation.  My preference is parallax powder once or twice a day when taking the narcotic pain medicine  It is important to eat a small meal or snack before taking pain medications to avoid nausea or stomach upset.    MEDICATION REFILLS - [phone]    If you need to request a medication refill, please call the office between 8:30am-4:30pm, Monday through Friday.    Any calls received outside of this timeframe will be handled on the next business day.    Medication requests received on Saturday or Sunday will be handled on Monday.    Please allow 3-5 business days for all medication requests to be  "processed.    RESTARTING HOME MEDICATIONS  You may restart your home medications the following day after your surgery UNLESS you have been given alternate instructions.    Follow the instructions given to you on your hospital discharge instructions for more information regarding your home medications.    ASSISTIVE DEVICE & MOVEMENT  Initially, you will use a walker or crutches to walk for the first 2 weeks.  Once your therapist feels you are ready, you will wean to one crutch or cane followed by no assistive device.  It is important to keep moving throughout recovery.  Walk at least 10 steps every hour that you are awake.  Stairs are part of your recovery, the \"good \"leg leads on the way up, the \"bad \"leg leads on the way down to, use the handrails and not the walker or crutches.  You should be up and walking around several times per day as well as bending your knee and making sure your knee is going completely straight (for knee replacements).  For anterior hip replacements avoid straight leg raise.    NUMBNESS/CLICKING    Decreased sensation or numbness is common on or around the area of the incision due to sensory nerves that are affected at the time of surgery.  The area of numbness will be lateral to the incision  You might always have numbness but the size of the area of numbness should decrease with time.  It is common for patients to have a “click” in the knee with movement.  This is usually nothing to be concerned about.  There are several reasons why your knee can be making these noises, including the implant components rubbing against each other, or a tendons going over a bony prominence.  Grinding can also occur and is not out of the ordinary.  Grinding can be caused by scar tissue formation  Noises will often settle over time once muscle strength improves.    DIFFICULTY SLEEPING    It is very common for patients to have difficulty sleeping at night which can be caused pain, medication, or feeling of " anxiety.  Sleep disturbance typically worsens 4-6 weeks after surgery.  You are permitted to sleep on your back or side with a  pillow between your legs for comfort            DRIVING & TRAVEL  Your surgeon will address this at your post-op appointment.  You must not be taking narcotic pain medication to be cleared to drive.  LEFT LEG JOINT REPLACMENT:  You may drive once you have regained full control of your leg, typically around 2 week after surgery  RIGHT LEG JOINT REPLACEMENT:  You must speak with your surgeon before you resume driving.  Driving can typically be resumed by 4-6 weeks after surgery.  During long distance travel, you should attempt to change position or stand every hour.  You should complete ankle pumps throughout your travel if you are sitting for long periods of time.  If traveling within the first 2 weeks after surgery, you should wear your compression stockings.    DENTAL & OTHER PROCEDURES    All patients must wait a minimum of 3 months for elective procedures, including routine dental cleanings.  For any dental appointment - cleaning or dental procedures - patients must take a prophylactic antibiotic 1 hour before the appointment.  You will also need to call for an antibiotic prior to any other invasive test, procedure, or surgery.  These prophylactic antibiotics will be needed for the rest of your life, in order to prevent infections.  Please call your surgeons office at [phone] to request the antibiotic.      PHYSICAL THERAPY    Following surgery it is important to progress through recovery with in-home or outpatient physical therapy.  You should continue to complete home exercises provided from the hospital on days that you are not working with a physical therapist.    It is common to have a temporary increase in pain and swelling upon starting outpatient physical therapy and/or changing your exercise routine.  Continue to use ice to help with symptoms.     FOLLOW-UP APPOINTMENT -  [phone]    Your post-surgical appointments will take place approximately 2 weeks, 6 weeks, 3 months and 1 year following surgery.  Joint replacements are monitored thereafter every 2-5 years for life.  If you have any questions or need to make changes to this appointment, please contact the office:    EMERGENCIES & WHEN TO CALL YOUR SURGEON  When to contact our office immediately:  Any falls or injury to the joint replacement  Fever >101.5 for at least 48 hours after surgery or chills.  Excessive bleeding from incision(s). A small amount of drainage is normal and expected.  Signs of infection of incision(s)-excessive drainage that is soaking through your dressing (especially if it is pus-like), redness that is spreading out from the edges of your incision, or increased warmth around the area.  Excruciating pain for which the pain medication, taken as instructed, is not helping.  Severe calf pain.  Go directly to the emergency room or call 911, if you are experiencing chest pain or difficulty breathing.              ICE/COLD THERAPY  Ice is most important during the first 2 weeks after surgery, but should be used for several weeks as needed.  Never place ice, or cold therapy devices directly on the skin.  You should always have a protective layer between your skin and the cold.  You have been prescribed to ice your total joint at a minimum of twice per hour for 20 minutes while awake during the first 6 weeks after surgery if you are using ice packs. This will help with pain control.  If you are using an ice machine, please follow ice machine instructions.  After knee replacement is extremely important to elevate the leg straight on an incline, not flat.    COLD THERAPY MACHINE RECOMMENDATIONS      Cold therapy devices can be used before and after surgery to assist in comfort and help to reduce pain and swelling.  These devices differ from ice or ice packs whereas the mechanism circulates water through tubing and  a pad to provide longer periods of cold therapy to the desired site.  While in the hospital, you can use your cold devices around the clock for optimal comfort.  We recommend using cold therapy after working with therapy or completing exercises on your own.  Once you are discharged home, there is no set schedule in which you must follow while using cold therapy.  Below are a few points to remember when using a cold therapy device:    Read the 's instructions prior to first the use.  Follow instructions for filling the cooler (water first, then ice).  Always make sure there is a layer of protection between the cold pad and your skin (Clothing, Towel, Ace Bandage, etc.)  Allow the device to circulate cold water throughout the pad prior wrapping the pad around your leg (approximately 10 minutes).  Place the pad on your leg in the desired position to meet your pain management needs and use the wraps provided to secure the pad to your body.  The purpose of this device is to use consistently throughout the day.  You do not need to need to use the 20 on, 20 off method when using an ice machine.  During waking hours, remove the cold pad every 1-2 hours to perform a skin check  Detach the pad from the cooler and ambulate at least once every hour  After removing the pad, allow at least 30 minutes before resuming cold therapy  You may wear the cold therapy device during periods of sleep including overnight    If you wake up during the night, you can check the skin at this time.  You do not need to wake up specifically to perform skin checks.  Empty the cooler and pad when device is not in use.  Follow 's instructions for cleaning your cold therapy device.    [facility] can assist with problems related to products purchased through the hospital  [phone] - [place]   or   [phone] - [place]    Sample Medication Schedule  [facility]    Medication Refills - [phone] - Monday  through Friday 8:30am-4:30pm  Please allow 3-5 days for medication refill requests to be processed.

## 2023-11-06 NOTE — OP NOTE
ARTHROPLASTY TOTAL HIP ANTERIOR APPROACH (R) Operative Note     Date: 2023  OR Location: [facility] OR    Name: Ok Chris : 1950, Age: 73 y.o., MRN: [id number], Sex: male    Diagnosis  Pre-op Diagnosis     * Primary osteoarthritis of right hip [M16.11] Post-op Diagnosis     * Primary osteoarthritis of right hip [M16.11]     Procedures  ARTHROPLASTY TOTAL HIP ANTERIOR APPROACH  71497 - MO ARTHRP ACETBLR/PROX FEM PROSTC AGRFT/ALGRFT      Surgeons      * Bebeto Buck - Primary    Resident/Fellow/Other Assistant:  Surgeon(s) and Role: Aba Palma PA-C    Procedure Summary  Anesthesia: General anesthetic with endotracheal intubation ASA: III  Anesthesia Staff: CRNA: ANGELINA Gambino-CRNA  Estimated Blood Loss: 100 mL  Intra-op Medications:   Medication Name Total Dose   sodium chloride (PF) 0.9% solution 18 mL   ketorolac (Toradol) injection 60 mg   ropivacaine (Naropin) injection 30 mL   EPINEPHrine HCl (PF) (Adrenalin) injection 1 mg   morphine PF (Duramorph) injection 5 mg   lactated Ringer's infusion Cannot be calculated              Anesthesia Record               Intraprocedure I/O Totals          Intake    Propofol Drip 0.00 mL    The total shown is the total volume documented since Anesthesia Start was filed.    lactated Ringer's infusion 1600.00 mL    Vasopressin Drip 0.00 mL    The total shown is the total volume documented since Anesthesia Start was filed.    Total Intake 1600 mL       Output    Est. Blood Loss 100 mL    Total Output 100 mL       Net    Net Volume 1500 mL          Specimen: No specimens collected     Staff:   Circulator: Cesia Avila RN  Scrub Person: Mary Grimes RN         Drains and/or Catheters: * None in log *    Tourniquet Times:         Implants:  Implants       Type Name Action Serial No.      Joint SHELL, TRIDENT II, CLUSTERHOLE, 56F - [id number] - [id number] Implanted 70-56F     Joint INSERT, TRIDENT X3 POLYETHYLENE, 0 DEG, 36MM F - [id number] - [id number]  Implanted [id number]     Joint HEAD, FEMUR V40 36MM 0MM BIOLOX DELTA - -2-336 - [id number] Implanted [id number]     Joint Hip INSIGNIA HIP STEM HIGH OFFSET SIZE 7 41 MM Implanted [id number]              Findings: See dictation below    Indications: Ok Chris is an 73 y.o. male who is having surgery for Primary osteoarthritis of right hip [M16.11].  In the form of a right direct anterior approach total arthroplasty    The patient was seen in the preoperative area. The risks, benefits, complications, treatment options, non-operative alternatives, expected recovery and outcomes were discussed with the patient. The possibilities of reaction to medication, pulmonary aspiration, injury to surrounding structures, bleeding, recurrent infection, the need for additional procedures, failure to diagnose a condition, and creating a complication requiring transfusion or operation were discussed with the patient. The patient concurred with the proposed plan, giving informed consent.  The site of surgery was properly noted/marked if necessary per policy. The patient has been actively warmed in preoperative area. Preoperative antibiotics have been ordered and given within 1 hours of incision. Venous thrombosis prophylaxis have been ordered including bilateral sequential compression devices    Procedure Details: Date of surgery: 11/6/2023    Location: [facility]    Pre-Operative Diagnosis: Right hip osteoarthritis    Post-Operative Diagnosis: Right hip osteoarthritis    Procedure: Right direct anterior approach total arthroplasty    Implants:  #1.  Pato insignia 132 degree femoral stem-size 7, high offset  2.  [place] Trident II acetabular cup-size 56 code F  3.  [place] X3, 0 degree nonlipped polyethylene liner-size 36 code F  4.  Biolox delta ceramic femoral head-size 36+0 mm length    Surgeon: Bebeto Buck DO    First Assistant: Aba Palma PA-C    Intraoperative pathology and findings/operative  indications:  The patient is a pleasant 73-year-old male longstanding history of bilateral hip pain.  Previously having undergone left total hip arthroplasty performed by myself several months ago from which she is doing quite well.  His right hip continues to be quite bothersome interfering with his quality of life.  Radiographs revealed advanced osteoarthritic change of the right hip bone-on-bone apposition of the femoral head and acetabulum subchondral cystic changes subchondral sclerosis and marginal osteophytic formation physical exam revealed an antalgic gait related to the right hip limited central compartment range of motion of the right hip.  With continued pain and disability related to his right hip ultimately he decreased move forward with total hip arthroplasty.  At the time of the procedure preoperative exam under anesthesia revealed overall equal limb lengths.  Intraoperatively he was found to have a hypertrophic capsule consistent with ankylosed nature of the joint.  Macerated degenerative labral tissue was noted circumferentially.  Hypertrophic arthritic related synovitic changes were noted throughout the joint.  Bone quality minimally osteopenic/osteoporotic.  In particular cartilage loss noted about the weightbearing aspect of the femoral head and acetabulum.  There was no indication of infection at the time of the procedure.    Procedure in detail:  The patient was correctly identified and marked in preoperative holding, risks benefits alternatives and reasonable expectations for outcomes have previously been discussed.  The patient was then escorted to operative suite #4 at [facility], once in the operative suite surgical pause/time-out was performed, preoperative antibiotics were administered and general anesthetic with endotracheal intubation and complete chemical paralysis was provided by the Department of Anesthesia.  The patient was positioned supine on a radiolucent operative  table, all bony prominences well padded.  Hips were positioned over the break in the bed.  Bilateral hips and lower extremities were then sterilely prepped and draped from umbilicus to ankles.  The surgical window was cut through the drapes over the level of the right hip, anatomic landmarks were identified and marked with sterile marking pen and this area was covered with an Ioban.  A 10 blade was used to make a standard 10 centimeter incision starting 3 centimeters lateral to the anterior superior iliac spine carried distally and obliquely aiming towards the lateral femoral epicondyle.  Careful dissection through subcutaneous tissues utilizing Bovie cautery to achieve hemostasis was performed until the investing fascia of tensor fascia hoa was identified.  This was then incised in line with the incision.  Muscle belly of the tensor fascia hoa was retracted laterally and the superior extracapsular retractor was inserted.  Circumflex vessels were identified and coagulated with the Aquamantys.  The prerectus fascia/fascia no name was then incised with Bovie cautery.  The inferior extracapsular retractor was then inserted.  Pre capsular fat was sharply excised with Bovie cautery.  Direct and reflected head of rectus femoris was then elevated off the anterior hip capsule with a Child elevator an anterior acetabular retractor was inserted.  A standard Z arthrotomy of the anterior hip capsule was performed tagging the superior and inferior leaflets.  The extracapsular retractors were then moved intracapsularly.  A standard step cut of the anterior femoral neck was then performed after anatomic landmarks had been identified.  Wafer of femoral neck bone was removed and the native femoral head was then removed with a corkscrew on power.  Acetabular retractors were inserted acetabular labrum was sharply excised circumferentially with a 10 blade.  Acetabular osteophytes removed with a rongeur.  The pulvinar was coagulated  with the Aquamantys and then sharply excised with Bovie cautery.  The wound was then copiously irrigated with sterile saline via simpulse lavage  The native femoral head measured 54 millimeters in diameter, as such we began reaming with a size 54 Reamer.  Sequential Reamer increasing by 2 millimeters in size each reaming was performed up to a size 56 millimeter Reamer at which point C-arm fluoroscopy was utilized to evaluate appropriate depth and positioning of ream.  At this point a size 56 acetabular cup was malleted into position under C-arm fluoroscopy with screw holes oriented within the safe zone.  Appropriate size fixation and positioning were documented on C-arm fluoroscopy.  The wound was then copiously irrigated with sterile saline via Simpulse lavage.  Size 36 polyethylene liner was then malleted into the locking mechanism and found to have excellent fixation.  C-arm fluoroscopy was used to inspect the final acetabular construct which was found to be quite acceptable.  At this point the femur was positioned for preparation.  Superior capsular release was completed and femoral elevation was achieved.  Femoral retractors were inserted the bed was transitioned into slight Trendelenburg in slight foot down position.  A box chisel was passed, followed by a curved curette and suction tip to function as canal finer followed by a lateralizing rasp.  At this point sequential broaching of the femoral canal was performed utilizing a automated broaching device starting with a size 0 broach all the way up to a size 7 broach.  At this point a trial 132 degree high offset neck was attached with a 36+0 millimeter length trial head.  The bed was leveled and open reduction of the hip was performed.  Leg lengths were palpated at the level of the patella and medial malleoli and intraoperative Galeazzi exam was performed.  C-Arm fluoroscopy was used to inspect the appropriate size, fit, fill and fixation of the femoral stem.   At this point leg lengths were tested and found to be equal with equal femoral lengths on intraoperative Galeazzi exam.  Stability of the hip was then inspected with hyperextension, hyperextension with external rotation hyperflexion, hyperflexion with internal rotation, hyperflexion with adduction past midline and internal rotation, the position of sleep as well as figure of 4.  The hip was found to be inherently quite stable.  At this point trial components were openly dislocated.  Repeat femoral exposure was achieved and trial components were removed.  Final implants were then impacted into place and found to have excellent fixation.  Final open reduction of the hip was performed leg length and stability were again assessed and found to be equal to that of the trial femoral components.  At this point C-arm fluoroscopy was used to inspect the final construct excellent acetabular cup position was identified excellent fit fill and positioning of the femoral stem was identified with appropriate offset and length.  At this point the wound was once again copiously irrigated with sterile saline and dilute sterile betadine.  The tag sutures about the anterior hip capsule were then removed and the anterior hip capsule was closed with a 0 Vicryl ligature in interrupted figure-of-eight fashion.  The pericapsular pain injection was then provided.  Investing fascia of the tensor fascia hoa was then closed with a 0 Vicryl ligature in running locked fashion.  Deep subcutaneous tissues were closed with a 2 0 Vicryl ligature in buried interrupted fashion.  Skin was reapproximated with 4 0 Monocryl in running subcuticular fashion followed by application of Dermabond.  Once the Dermabond was completely dried a self adherent Mepilex Silver dressing was applied over top the wound followed by a towel an iceman cooler.  The patient was then woken, extubated, transferred to a hospital bed and returned to PACU in stable condition.   Counts were correct case was clean elective complications were none specimens were none estimated blood loss was 100 cubic centimeters.      Complications:  None; patient tolerated the procedure well.    Disposition: PACU - hemodynamically stable.  Condition: stable         Additional Details:     Attending Attestation: I performed the procedure.    Bebeto Buck  Phone Number: [phone]

## 2023-11-06 NOTE — PROGRESS NOTES
Physical Therapy    Physical Therapy Evaluation & Treatment    Patient Name: Ok Chris  MRN: [id number]  Today's Date: 11/6/2023   Time Calculation  Start Time: 1516  Stop Time: 1544  Time Calculation (min): 28 min    Assessment/Plan   PT Assessment  PT Assessment Results: Decreased strength, Decreased endurance, Impaired balance, Decreased mobility  Rehab Prognosis: Excellent  Evaluation/Treatment Tolerance: Patient tolerated treatment well  Medical Staff Made Aware: Yes  Strengths: Ability to acquire knowledge, Housing layout, Rehab experience, Support of Caregivers  End of Session Communication: Bedside nurse  Assessment Comment: Patient tolerated mobility well, familiar with plan d/t L ALBERTO completed in Spring of 2023. Eager to improve  End of Session Patient Position: Up in chair (B LE elevated with ice to R hip)     PT Plan  Treatment/Interventions: Bed mobility, Transfer training, Gait training, Stair training, Balance training, Strengthening, Therapeutic exercise  PT Plan: Skilled PT  PT Frequency: 2 times per week  PT Discharge Recommendations: Low intensity level of continued care  Equipment Recommended upon Discharge:  (Owns 2WW)  PT Recommended Transfer Status: Assist x1  PT - OK to Discharge: Yes      Subjective     General Visit Information:  General  Reason for Referral: R anterior ALBERTO  Referred By: Bebeto Buck  Past Medical History Relevant to Rehab: PMH: afib, HPL, OA, thoracic aortic aneurysm  Family/Caregiver Present: No  Prior to Session Communication: Bedside nurse  Patient Position Received: Up in chair  General Comment: Patient pleasant, coopertaive and agreeable to PT eval  Home Living:  Home Living  Type of Home: House  Lives With: Spouse  Home Layout: Two level, Able to live on main level with bedroom/bathroom  Home Access: Stairs to enter with rails  Entrance Stairs-Rails: Right  Entrance Stairs-Number of Steps: 3  Bathroom Shower/Tub: Walk-in shower  Prior Level of Function:  Prior  Function Per Pt/Caregiver Report  Level of [place]: Independent with ADLs and functional transfers  ADL Assistance: Independent  Homemaking Assistance: Independent  Ambulatory Assistance: Independent  Prior Function Comments: Patient had L ALBERTO in Spring of 2023  Precautions:  Precautions  LE Weight Bearing Status: Weight Bearing as Tolerated  Post-Surgical Precautions: Right hip precautions (Anterior)  Vital Signs:       Objective   Pain:  Pain Assessment  Pain Assessment: 0-10  Pain Score: 0 - No pain  Cognition:  Cognition  Overall Cognitive Status: Within Functional Limits    General Assessments:                Activity Tolerance  Endurance: Tolerates 30 min exercise with multiple rests    Sensation  Light Touch: No apparent deficits    Strength  Strength Comments: R hip 3+/5, R knee and ankle 4/5, L LE 4+/5           Coordination  Movements are Fluid and Coordinated: Yes    Postural Control  Postural Control: Within Functional Limits    Static Sitting Balance  Static Sitting-Balance Support: No upper extremity supported, Feet supported  Static Sitting-Level of Assistance: Independent    Static Standing Balance  Static Standing-Balance Support: Bilateral upper extremity supported  Static Standing-Level of Assistance: Close supervision  Functional Assessments:  ADL  ADL's Addressed: Yes  Toileting Assistance with Device: Modified independent (Standing in restroom to urinate, 2WW used for support)         Transfers  Transfer: Yes  Transfer 1  Transfer From 1: Sit to  Transfer to 1: Stand  Technique 1: Sit to stand, Stand to sit  Transfer Device 1: Walker  Transfer Level of Assistance 1: Close supervision    Ambulation/Gait Training  Ambulation/Gait Training Performed: Yes  Ambulation/Gait Training 1  Surface 1: Level tile  Device 1: Rolling walker  Assistance 1: Close supervision  Quality of Gait 1:  (Steady gait, step to pattern, decreased araceli)  Comments/Distance (ft) 1: 15' x 2      Treatments:  Patient  encouraged to complete supine ther ex 3x/day. Reviewed 15  reps each: ankle pumps, quad sets, glut sets, hip abd/add, heel slides, SAQ. Patient advised home care will advance ther ex as tolerated   Encouraged to take short duration ambulation bouts hourly during waking hours with the use of 2WW, focusing on gait pattern.   Educated on the importance of avoiding remaining in one position for extended period of time. Encouraged pain and edema control with use of ice, elevation and activity pacing. Patient verbalized understanding.     Reviewed ALBERTO precautions   Home going packet reviewed and provided to patient. Patient verbalized understanding.         Ambulation/Gait Training  Ambulation/Gait Training Performed: Yes  Ambulation/Gait Training 1  Surface 1: Level tile  Device 1: Rolling walker  Assistance 1: Close supervision  Quality of Gait 1:  (Steady gait, step to pattern, decreased araceli)  Comments/Distance (ft) 1: 15' x 2  Transfers  Transfer: Yes  Transfer 1  Transfer From 1: Sit to  Transfer to 1: Stand  Technique 1: Sit to stand, Stand to sit  Transfer Device 1: Walker  Transfer Level of Assistance 1: Close supervision       Outcome Measures:  [facility] Basic Mobility  Turning from your back to your side while in a flat bed without using bedrails: A little  Moving from lying on your back to sitting on the side of a flat bed without using bedrails: A little  Moving to and from bed to chair (including a wheelchair): A little  Standing up from a chair using your arms (e.g. wheelchair or bedside chair): A little  To walk in hospital room: A little  Climbing 3-5 steps with railing: A little  Basic Mobility - Total Score: 18    Encounter Problems       Encounter Problems (Active)       Mobility       Patient will demonstrate good understanding of bed mobility, transfers, ambulation, stair negotiation and HEP for safe home going.  (Progressing)       Start:  11/06/23    Expected End:  11/08/23                     Pain  - Adult              Education Documentation  Handouts, taught by Brittanie Hummel, PT at 11/6/2023  5:02 PM.  Learner: Patient  Readiness: Acceptance  Method: Explanation, Handout  Response: Verbalizes Understanding    Precautions, taught by Brittanie Hummel PT at 11/6/2023  5:02 PM.  Learner: Patient  Readiness: Acceptance  Method: Explanation, Handout  Response: Verbalizes Understanding    Body Mechanics, taught by Brittanie Hummel, PT at 11/6/2023  5:02 PM.  Learner: Patient  Readiness: Acceptance  Method: Explanation, Handout  Response: Verbalizes Understanding    Home Exercise Program, taught by Brittanie Hummel PT at 11/6/2023  5:02 PM.  Learner: Patient  Readiness: Acceptance  Method: Explanation, Handout  Response: Verbalizes Understanding    Mobility Training, taught by Brittanie Hummel PT at 11/6/2023  5:02 PM.  Learner: Patient  Readiness: Acceptance  Method: Explanation, Handout  Response: Verbalizes Understanding    Education Comments  No comments found.

## 2023-11-06 NOTE — ANESTHESIA PROCEDURE NOTES
Airway  Date/Time: 11/6/2023 9:42 AM  Urgency: elective    Airway not difficult    Staffing  Performed: CRNA   Authorized by: MAGDA Gambino    Performed by: MAGDA Gambino  Patient location during procedure: OR    Indications and Patient Condition  Indications for airway management: anesthesia  Spontaneous Ventilation: absent  Sedation level: deep  Preoxygenated: yes  Patient position: sniffing  Mask difficulty assessment: 1 - vent by mask    Final Airway Details  Final airway type: endotracheal airway      Successful airway: ETT  Cuffed: yes   Successful intubation technique: video laryngoscopy  Facilitating devices/methods: intubating stylet  Endotracheal tube insertion site: oral  Blade size: #4  ETT size (mm): 8.0  Cormack-Lehane Classification: grade I - full view of glottis  Number of attempts at approach: 1

## 2023-11-06 NOTE — ANESTHESIA PREPROCEDURE EVALUATION
Patient: Ok Chris    Procedure Information       Date/Time: 11/06/23 0930    Procedure: ARTHROPLASTY TOTAL HIP ANTERIOR APPROACH (Right: Hip)    Location: [facility] / [facility]    Surgeons: Bebeto Buck DO     Vitals:    11/06/23 0721   BP: 107/63   Pulse: 64   Resp: 18   Temp: 36 °C (96.8 °F)   SpO2: 98%       Past Surgical History:   Procedure Laterality Date   • EYE SURGERY     • HERNIA REPAIR  06/23/2016    Hernia Repair   • OTHER SURGICAL HISTORY  06/23/2016    Dental Implant   • TONSILLECTOMY  06/24/2016    Tonsillectomy   • TOTAL HIP ARTHROPLASTY  04/2023   • VASECTOMY  06/24/2016    Surgery Vas Deferens Vasectomy     Past Medical History:   Diagnosis Date   • Atrial fibrillation (CMS/HCC)    • Encounter for removal of sutures 07/01/2020    Visit for suture removal   • Hyperlipidemia, unspecified 03/12/2019    Borderline hyperlipidemia   • Laceration without foreign body of unspecified ear, initial encounter 07/01/2020    Laceration of ear lobe   • OA (osteoarthritis)    • Personal history of other diseases of the circulatory system 06/23/2016    History of irregular heartbeat   • Personal history of other specified conditions 07/30/2020    History of syncope   • Personal history of traumatic brain injury 06/24/2016    History of concussion   • Thoracic aortic aneurysm (CMS/HCC)     3.7cm 6/24/2022       Current Facility-Administered Medications:   •  lactated Ringer's infusion, 100 mL/hr, intravenous, Continuous, Pasha Figueredo MD  •  lactated Ringer's infusion, 100 mL/hr, intravenous, Continuous, Sukumar Palma PA-C  Prior to Admission medications    Medication Sig Start Date End Date Taking? Authorizing Provider   aspirin 81 mg EC tablet Take 1 tablet (81 mg) by mouth once daily.   Yes Historical Provider, MD   cetirizine (ZyrTEC) 10 mg tablet Take 1 tablet (10 mg) by mouth once daily.   Yes Historical Provider, MD   chlorhexidine (Peridex) 0.12 % solution Use 15 mL in the mouth or throat once  daily. Swish and spit one capful the night before surgery and morning  of surgery 10/20/23 1/18/24 Yes Michelle Hull PA-C   Lumigan 0.01 % ophthalmic solution INSTILL 1 DROP INTO BOTH EYES ONCE PER DAY AT BEDTIME.   Yes Historical Provider, MD   rosuvastatin (Crestor) 40 mg tablet TAKE 1 TABLET BY MOUTH EVERY DAY  Patient taking differently: Pt takes in afternoon 10/5/23  Yes Tequila Mayberry,    carisoprodol (Soma) 350 mg tablet Take 1 tablet (350 mg) by mouth 3 times a day as needed for muscle spasms. 11/6/23   Sukumar Palma PA-C   celecoxib (CeleBREX) 200 mg capsule Take 1 capsule (200 mg) by mouth 2 times a day. 11/6/23   Sukumar Palma PA-C   docusate sodium (Colace) 100 mg capsule Take 1 capsule (100 mg) by mouth 2 times a day as needed for constipation. 11/6/23   Sukumar Palma PA-C   gabapentin (Neurontin) 300 mg capsule Take 1 capsule (300 mg) by mouth 3 times a day as needed (muscle spasms). 11/6/23   Sukumar Palma PA-C   oxyCODONE-acetaminophen (Percocet) 5-325 mg tablet Take 1 tablet by mouth every 6 hours if needed for severe pain (7 - 10). 11/6/23   Sukumar Palma PA-C   ascorbic acid (Vitamin C) 500 mg tablet Take 1 tablet (500 mg) by mouth 2 times a day. 11/6/23 11/6/23  Sukumar Palma PA-C     Allergies   Allergen Reactions   • Bee Venom Protein (Honey Bee) Unknown     Social History     Tobacco Use   • Smoking status: Never   • Smokeless tobacco: Never   Substance Use Topics   • Alcohol use: Not Currently         Chemistry    Lab Results   Component Value Date/Time     10/31/2023 0928    K 4.8 10/31/2023 0928     10/31/2023 0928    CO2 29 10/31/2023 0928    BUN 8 10/31/2023 0928    CREATININE 0.92 10/31/2023 0928    Lab Results   Component Value Date/Time    CALCIUM 9.0 10/31/2023 0928    ALKPHOS 92 10/31/2023 0928    AST 17 10/31/2023 0928    ALT 12 10/31/2023 0928    BILITOT 0.6 10/31/2023 0928          Lab Results   Component Value Date/Time    WBC 6.7 10/31/2023 0928    HGB  12.9 (L) 10/31/2023 0928    HCT 43.1 10/31/2023 0928     10/31/2023 0928     Lab Results   Component Value Date/Time    PROTIME 12.1 06/01/2020 1409    INR 1.0 06/01/2020 1409     No results found for this or any previous visit (from the past 4464 hour(s)).  No results found for this or any previous visit from the past 1095 days.        Relevant Problems   Anesthesia (within normal limits)      Cardiovascular   (+) Atrial fibrillation (CMS/HCC)   (+) Dyslipidemia, goal LDL below 70   (+) Thoracic aortic aneurysm (TAA) (CMS/HCC)      GI/Hepatic   (+) Elevated LFTs      Eyes, Ears, Nose, and Throat   (+) Bilateral sensorineural hearing loss      Infectious Disease   (+) Tinea pedis of both feet       Clinical information reviewed:   Tobacco  Allergies  Meds   Med Hx  Surg Hx   Fam Hx  Soc Hx        NPO Detail:  NPO/Void Status  Carbonhydrate Drink Given Prior to Surgery? : N  Date of Last Liquid: 11/05/23  Time of Last Liquid: 2300  Date of Last Solid: 11/05/23  Time of Last Solid: 2300  Last Intake Type: Clear fluids  Time of Last Void: 0600         Physical Exam    Airway  Mallampati: I  TM distance: >3 FB  Neck ROM: full     Cardiovascular - normal exam     Dental    Pulmonary - normal exam     Abdominal - normal exam         Anesthesia Plan    ASA 3     general   (Spinal relatively CI due to valvular Renteria  ... Recommend GA)  intravenous induction   Anesthetic plan and risks discussed with patient.  Use of blood products discussed with patient who.    Plan discussed with CRNA and attending.

## 2023-11-06 NOTE — DISCHARGE SUMMARY
Discharge Diagnosis  Status post total hip replacement, right    Issues Requiring Follow-Up  Right direct anterior approach total hip arthroplasty    Test Results Pending At Discharge  Pending Labs       No current pending labs.            Hospital Course   The patient is a pleasant 73 year old male who underwent an elective right direct anterior approach total hip arthroplasty performed by Dr. Buck at [facility] on 11/6/2023.  Patient had a routine uncomplicated preoperative, intraoperative and immediate postoperative surgical course.  As planned postoperatively the patient was admitted to the regular nursing floor at [facility].  While in the regular nursing floor patient received consultations from both internal medicine as well as physical therapy.  Patient received preoperative and postoperative intravenous antibiotics.  Pain control is with a combination of both oral and IV narcotic and nonnarcotic medications.  DVT prophylaxis was with sequentials early ambulation and aspirin therapy.  Anticoagulation medications will be continued for minimum of 30 days postsurgery.  Patient was discharged home with home physical therapy and home health care outpatient follow-up is being arranged for 2 weeks in the outpatient clinic postdischarge. FU at [facility] on 11/21/2023 at **. Leave silver bandage on until then. Able to shower over top bandage. Weight bearing as tolerated. No hip flexor activites for 6 weeks.       Pertinent Physical Exam At Time of Discharge  Physical Exam  Cardiovascular:      Rate and Rhythm: Normal rate.   Pulmonary:      Effort: Pulmonary effort is normal.   Abdominal:      Palpations: Abdomen is soft.   Musculoskeletal:         General: Swelling and tenderness present.      Cervical back: Normal range of motion.      Right lower leg: Edema present.      Comments: Postoperative bandages are appreciated on operative side.    Neurological:      Mental Status: He is alert.          Home Medications     Medication List       Notice    Cannot display discharge medications because the patient has not yet been   admitted.       Outpatient Follow-Up  Future Appointments   Date Time Provider Department Center   1/4/2024  9:00 AM Nathen Pena MD [address] at [facility] on 11/21/2023 at **. Leave silver bandage on until then. Able to shower over top bandage. Weight bearing as tolerated. No hip flexor activites for 6 weeks.     Sukumar Palma PA-C

## 2023-11-07 VITALS
DIASTOLIC BLOOD PRESSURE: 69 MMHG | BODY MASS INDEX: 26.2 KG/M2 | HEIGHT: 70 IN | OXYGEN SATURATION: 96 % | RESPIRATION RATE: 16 BRPM | WEIGHT: 182.98 LBS | SYSTOLIC BLOOD PRESSURE: 131 MMHG | TEMPERATURE: 96.8 F | HEART RATE: 62 BPM

## 2023-11-07 PROBLEM — M16.11 OSTEOARTHRITIS OF RIGHT HIP, UNSPECIFIED OSTEOARTHRITIS TYPE: Status: ACTIVE | Noted: 2023-11-07

## 2023-11-07 LAB
ANION GAP SERPL CALC-SCNC: 10 MMOL/L (ref 10–20)
BUN SERPL-MCNC: 14 MG/DL (ref 6–23)
CALCIUM SERPL-MCNC: 7.8 MG/DL (ref 8.6–10.3)
CHLORIDE SERPL-SCNC: 103 MMOL/L (ref 98–107)
CO2 SERPL-SCNC: 29 MMOL/L (ref 21–32)
CREAT SERPL-MCNC: 0.93 MG/DL (ref 0.5–1.3)
ERYTHROCYTE [DISTWIDTH] IN BLOOD BY AUTOMATED COUNT: 13.3 % (ref 11.5–14.5)
GFR SERPL CREATININE-BSD FRML MDRD: 86 ML/MIN/1.73M*2
GLUCOSE SERPL-MCNC: 129 MG/DL (ref 74–99)
HCT VFR BLD AUTO: 34.2 % (ref 41–52)
HGB BLD-MCNC: 10.9 G/DL (ref 13.5–17.5)
MCH RBC QN AUTO: 29.1 PG (ref 26–34)
MCHC RBC AUTO-ENTMCNC: 31.9 G/DL (ref 32–36)
MCV RBC AUTO: 91 FL (ref 80–100)
NRBC BLD-RTO: 0 /100 WBCS (ref 0–0)
PLATELET # BLD AUTO: 165 X10*3/UL (ref 150–450)
POTASSIUM SERPL-SCNC: 4.5 MMOL/L (ref 3.5–5.3)
RBC # BLD AUTO: 3.75 X10*6/UL (ref 4.5–5.9)
SODIUM SERPL-SCNC: 137 MMOL/L (ref 136–145)
WBC # BLD AUTO: 14.6 X10*3/UL (ref 4.4–11.3)

## 2023-11-07 PROCEDURE — 36415 COLL VENOUS BLD VENIPUNCTURE: CPT

## 2023-11-07 PROCEDURE — 2500000001 HC RX 250 WO HCPCS SELF ADMINISTERED DRUGS (ALT 637 FOR MEDICARE OP)

## 2023-11-07 PROCEDURE — 97116 GAIT TRAINING THERAPY: CPT | Mod: GP

## 2023-11-07 PROCEDURE — 85027 COMPLETE CBC AUTOMATED: CPT

## 2023-11-07 PROCEDURE — 80048 BASIC METABOLIC PNL TOTAL CA: CPT

## 2023-11-07 PROCEDURE — 2500000004 HC RX 250 GENERAL PHARMACY W/ HCPCS (ALT 636 FOR OP/ED)

## 2023-11-07 PROCEDURE — 7100000011 HC EXTENDED STAY RECOVERY HOURLY - NURSING UNIT

## 2023-11-07 PROCEDURE — 97110 THERAPEUTIC EXERCISES: CPT | Mod: GP

## 2023-11-07 RX ADMIN — ASPIRIN 325 MG: 325 TABLET, COATED ORAL at 08:39

## 2023-11-07 RX ADMIN — CEFAZOLIN SODIUM 2 G: 2 INJECTION, SOLUTION INTRAVENOUS at 00:17

## 2023-11-07 ASSESSMENT — COGNITIVE AND FUNCTIONAL STATUS - GENERAL
DAILY ACTIVITIY SCORE: 18
STANDING UP FROM CHAIR USING ARMS: A LITTLE
MOBILITY SCORE: 23
DRESSING REGULAR LOWER BODY CLOTHING: A LITTLE
TOILETING: A LITTLE
CLIMB 3 TO 5 STEPS WITH RAILING: A LITTLE
MOVING FROM LYING ON BACK TO SITTING ON SIDE OF FLAT BED WITH BEDRAILS: A LITTLE
EATING MEALS: A LITTLE
MOVING TO AND FROM BED TO CHAIR: A LITTLE
DRESSING REGULAR UPPER BODY CLOTHING: A LITTLE
HELP NEEDED FOR BATHING: A LITTLE
WALKING IN HOSPITAL ROOM: A LITTLE
MOBILITY SCORE: 20
PERSONAL GROOMING: A LITTLE

## 2023-11-07 ASSESSMENT — PAIN SCALES - GENERAL
PAINLEVEL_OUTOF10: 0 - NO PAIN

## 2023-11-07 ASSESSMENT — PAIN - FUNCTIONAL ASSESSMENT: PAIN_FUNCTIONAL_ASSESSMENT: 0-10

## 2023-11-07 NOTE — PROGRESS NOTES
11/07/23 1008   Discharge Planning   Living Arrangements Spouse/significant other   Support Systems Spouse/significant other;Family members;Friends/neighbors   Assistance Needed Patient is from home with spouse, is independent with ADLs, uses no assistive devices for ambulation at baseline but does have a walker if it is needed and drives.   Type of Residence Private residence   Number of Stairs to Enter Residence 2   Number of Stairs Within Residence 0   Who is requesting discharge planning? Provider   Home or Post Acute Services In home services   Type of Home Care Services Home PT   Patient expects to be discharged to: Home with new [facility]   Does the patient need discharge transport arranged? No   Patient Choice   Provider Choice list and CMS website (https://medicare.gov/care-compare#search) for post-acute Quality and Resource Measure Data were provided and reviewed with: Patient   Patient / Family choosing to utilize agency / facility established prior to hospitalization No

## 2023-11-07 NOTE — ANESTHESIA POSTPROCEDURE EVALUATION
Patient: Ok Chris    Procedure Summary       Date: 11/06/23 Room / Location: [facility] / [facility]    Anesthesia Start: 0929 Anesthesia Stop: 1212    Procedure: ARTHROPLASTY TOTAL HIP ANTERIOR APPROACH (Right: Hip) Diagnosis:       Primary osteoarthritis of right hip      (Primary osteoarthritis of right hip [M16.11])    Surgeons: Bebeto Buck DO Responsible Provider: MAGDA Gambino    Anesthesia Type: general ASA Status: 3            Anesthesia Type: general    Vitals Value Taken Time   /64 11/06/23 1330   Temp 36.1 °C (97 °F) 11/06/23 1156   Pulse 74 11/06/23 1330   Resp 16 11/06/23 1330   SpO2 96 % 11/06/23 1330       Anesthesia Post Evaluation    Patient location during evaluation: PACU  Patient participation: complete - patient participated  Level of consciousness: awake  Pain score: 2  Pain management: adequate  Multimodal analgesia pain management approach  Airway patency: patent  Two or more strategies used to mitigate risk of obstructive sleep apnea  Cardiovascular status: acceptable  Respiratory status: acceptable  Hydration status: acceptable    No notable events documented.

## 2023-11-07 NOTE — CARE PLAN
Problem: Pain - Adult  Goal: Verbalizes/displays adequate comfort level or baseline comfort level  Outcome: Progressing     Problem: Safety - Adult  Goal: Free from fall injury  Outcome: Progressing     Problem: Discharge Planning  Goal: Discharge to home or other facility with appropriate resources  Outcome: Progressing     Problem: Chronic Conditions and Co-morbidities  Goal: Patient's chronic conditions and co-morbidity symptoms are monitored and maintained or improved  Outcome: Progressing     Problem: Hip Replacement Intial Post Op  Goal: Activity/Mobility Safety  Outcome: Progressing   The patient's goals for the shift include  not to need any pain meds this shift    The clinical goals for the shift include Patient will ambulate in the hallway this shift.    Over the shift, the patient did make progress toward the following goals. Barriers to progression include pain.   Recommendations to address these barriers include medications, relaxation, elevation, and ice.   Patient tolerated ambulation to the bathroom and in the hallway this shift. Patient was able to void.   Patient was able to sleep at least 6 hours this shift, on and off.

## 2023-11-07 NOTE — PROGRESS NOTES
Physical Therapy    Physical Therapy Treatment    Patient Name: Ok Chris  MRN: [id number]  Today's Date: 11/7/2023  Time Calculation  Start Time: 0850  Stop Time: 0914  Time Calculation (min): 24 min       Assessment/Plan   PT Assessment  PT Assessment Results: Decreased strength, Decreased endurance, Impaired balance, Decreased mobility  Rehab Prognosis: Excellent  Evaluation/Treatment Tolerance: Patient tolerated treatment well  Medical Staff Made Aware: Yes  Strengths: Ability to acquire knowledge, Housing layout, Rehab experience, Support of Caregivers  End of Session Communication: Bedside nurse  Assessment Comment: Patient tolerates tx well, feels confident returning home  End of Session Patient Position: Up in chair (B LE elevated, ice to R hip)     PT Plan  Treatment/Interventions: Bed mobility, Transfer training, Gait training, Balance training, Strengthening  PT Plan: Skilled PT  PT Frequency: 2 times per week  PT Discharge Recommendations: Low intensity level of continued care  Equipment Recommended upon Discharge: Wheeled walker  PT Recommended Transfer Status: Assist x1  PT - OK to Discharge: Yes      General Visit Information:   PT  Visit  PT Received On: 11/07/23  Response to Previous Treatment: Patient with no complaints from previous session.  General  Reason for Referral: R anterior ALBERTO  Referred By: Bebeto Buck  Past Medical History Relevant to Rehab: PMH: afib, HPL, OA, thoracic aortic aneurysm  Family/Caregiver Present: No  Prior to Session Communication: Bedside nurse  Patient Position Received: Up in chair  General Comment: Patient pleasant, coopertaive and agreeable to PT tx    Subjective   Precautions:  Precautions  LE Weight Bearing Status: Weight Bearing as Tolerated  Post-Surgical Precautions: Right hip precautions (Anterior)    Objective   Pain:  Pain Assessment  Pain Assessment: 0-10  Pain Score: 0 - No pain  Cognition:  Cognition  Overall Cognitive Status: Within Functional  Limits  Orientation Level: Oriented X4  Postural Control:  Postural Control  Postural Control: Within Functional Limits    Activity Tolerance:  Activity Tolerance  Endurance: Tolerates 30 min exercise with multiple rests  Treatments:  Patient encouraged to complete supine ther ex 3x/day. Reviewed 15  reps each: ankle pumps, quad sets, glut sets, hip abd/add, heel slides, SAQ. Patient advised home care will advance ther ex as tolerated   Encouraged to take short duration ambulation bouts hourly during waking hours with the use of 2WW, focusing on gait pattern.   Educated on the importance of avoiding remaining in one position for extended period of time. Encouraged pain and edema control with use of ice, elevation and activity pacing. Patient verbalized understanding.     Reviewed ALBERTO precautions   Home going packet reviewed and provided to patient. Patient verbalized understanding.     Reviewed car transfer, patient verbalized understanding. Recommended pushing seat back as far as possible, don't use door as support, recline seat to provide increased space for hip mobility. Sit first and then swivel into vehicle. Plans to d/c into a midsize SUV. Transfer completed at supervision level in simulator     Therapeutic Exercise  Therapeutic Exercise Performed: Yes  Therapeutic Exercise Activity 1: Patient states he has independently completed: ankle pumps, quad sets and glut sets  Therapeutic Exercise Activity 2: with PT, long sitting in chair x 15 each: R hip abd/add, R heel slides, SAQ    Ambulation/Gait Training  Ambulation/Gait Training Performed: Yes  Ambulation/Gait Training 1  Surface 1: Level tile  Device 1: Rolling walker  Assistance 1: Independent  Quality of Gait 1:  (steady gait, decreased araceli)  Comments/Distance (ft) 1: 148' x 2  Transfers  Transfer: Yes  Transfer 1  Transfer From 1: Sit to  Transfer to 1: Stand  Technique 1: Sit to stand, Stand to sit  Transfer Device 1: Walker  Transfer Level of  Assistance 1: Independent    Stairs  Stairs: Yes  Stairs  Rails 1: Right  Device 1: Railing  Assistance 1: Close supervision  Comment/Number of Steps 1: 4    Outcome Measures:  [facility] Basic Mobility  Turning from your back to your side while in a flat bed without using bedrails: None  Moving from lying on your back to sitting on the side of a flat bed without using bedrails: None  Moving to and from bed to chair (including a wheelchair): A little  Standing up from a chair using your arms (e.g. wheelchair or bedside chair): A little  To walk in hospital room: A little  Climbing 3-5 steps with railing: A little  Basic Mobility - Total Score: 20    Education Documentation  Handouts, taught by Brittanie Hummel PT at 11/7/2023 10:54 AM.  Learner: Patient  Readiness: Acceptance  Method: Explanation, Handout  Response: Verbalizes Understanding    Precautions, taught by Brittanie Hummel PT at 11/7/2023 10:54 AM.  Learner: Patient  Readiness: Acceptance  Method: Explanation, Handout  Response: Verbalizes Understanding    Body Mechanics, taught by Brittanie Hummel PT at 11/7/2023 10:54 AM.  Learner: Patient  Readiness: Acceptance  Method: Explanation, Handout  Response: Verbalizes Understanding    Home Exercise Program, taught by Brittanie Hummel PT at 11/7/2023 10:54 AM.  Learner: Patient  Readiness: Acceptance  Method: Explanation, Handout  Response: Verbalizes Understanding    Mobility Training, taught by Brittanie Hummel PT at 11/7/2023 10:54 AM.  Learner: Patient  Readiness: Acceptance  Method: Explanation, Handout  Response: Verbalizes Understanding    Handouts, taught by Brittanie Hummel PT at 11/6/2023  5:02 PM.  Learner: Patient  Readiness: Acceptance  Method: Explanation, Handout  Response: Verbalizes Understanding    Precautions, taught by Brittanie Hummel PT at 11/6/2023  5:02 PM.  Learner: Patient  Readiness: Acceptance  Method: Explanation, Handout  Response: Verbalizes Understanding    Body Mechanics, taught by Brittanie  Shaheed PT at 11/6/2023  5:02 PM.  Learner: Patient  Readiness: Acceptance  Method: Explanation, Handout  Response: Verbalizes Understanding    Home Exercise Program, taught by Brittanie Hummel PT at 11/6/2023  5:02 PM.  Learner: Patient  Readiness: Acceptance  Method: Explanation, Handout  Response: Verbalizes Understanding    Mobility Training, taught by Brittanie Hummel PT at 11/6/2023  5:02 PM.  Learner: Patient  Readiness: Acceptance  Method: Explanation, Handout  Response: Verbalizes Understanding    Education Comments  No comments found.        OP EDUCATION:       Encounter Problems       Encounter Problems (Active)       Pain - Adult             Encounter Problems (Resolved)       Mobility       Patient will demonstrate good understanding of bed mobility, transfers, ambulation, stair negotiation and HEP for safe home going.  (Met)       Start:  11/06/23    Expected End:  11/08/23    Resolved:  11/07/23

## 2023-11-08 ENCOUNTER — HOME CARE VISIT (OUTPATIENT)
Dept: HOME HEALTH SERVICES | Facility: HOME HEALTH | Age: 73
End: 2023-11-08
Payer: MEDICARE

## 2023-11-08 VITALS
HEART RATE: 68 BPM | DIASTOLIC BLOOD PRESSURE: 73 MMHG | SYSTOLIC BLOOD PRESSURE: 104 MMHG | OXYGEN SATURATION: 98 % | BODY MASS INDEX: 26.48 KG/M2 | RESPIRATION RATE: 16 BRPM | WEIGHT: 185 LBS | HEIGHT: 70 IN

## 2023-11-08 PROCEDURE — 169592 NO-PAY CLAIM PROCEDURE

## 2023-11-08 PROCEDURE — 1090000001 HH PPS REVENUE CREDIT

## 2023-11-08 PROCEDURE — 1090000002 HH PPS REVENUE DEBIT

## 2023-11-08 PROCEDURE — 0023 HH SOC

## 2023-11-08 PROCEDURE — G0151 HHCP-SERV OF PT,EA 15 MIN: HCPCS | Mod: HHH

## 2023-11-08 SDOH — HEALTH STABILITY: PHYSICAL HEALTH: EXERCISE COMMENTS: REVIEWED GLUT AND QUAD SETS, ANKLE ROM S/P THA.  REVIEWED HIP PRECAUTIONS.

## 2023-11-08 ASSESSMENT — ENCOUNTER SYMPTOMS
PAIN SEVERITY GOAL: 1/10
MUSCLE WEAKNESS: 1
PAIN LOCATION - PAIN SEVERITY: 1/10
LOWEST PAIN SEVERITY IN PAST 24 HOURS: 1/10
PAIN LOCATION: RIGHT HIP
SUBJECTIVE PAIN PROGRESSION: RAPIDLY IMPROVING
LIMITED RANGE OF MOTION: 1
HIGHEST PAIN SEVERITY IN PAST 24 HOURS: 2/10
PERSON REPORTING PAIN: PATIENT
PAIN: 1

## 2023-11-08 ASSESSMENT — ACTIVITIES OF DAILY LIVING (ADL)
CURRENT_FUNCTION: SUPERVISION
PHYSICAL TRANSFERS ASSESSED: 1
AMBULATION ASSISTANCE ON FLAT SURFACES: 1
AMBULATION_DISTANCE/DURATION_TOLERATED: 50 FT
AMBULATION ASSISTANCE: 1
AMBULATION ASSISTANCE: STAND BY ASSIST

## 2023-11-08 ASSESSMENT — PAIN SCALES - PAIN ASSESSMENT IN ADVANCED DEMENTIA (PAINAD)
TOTALSCORE: 0
BREATHING: 0
BODYLANGUAGE: 0
NEGVOCALIZATION: 0 - NONE.
FACIALEXPRESSION: 0
NEGVOCALIZATION: 0
FACIALEXPRESSION: 0 - SMILING OR INEXPRESSIVE.
CONSOLABILITY: 0
CONSOLABILITY: 0 - NO NEED TO CONSOLE.
BODYLANGUAGE: 0 - RELAXED.

## 2023-11-09 PROCEDURE — 1090000001 HH PPS REVENUE CREDIT

## 2023-11-09 PROCEDURE — 1090000002 HH PPS REVENUE DEBIT

## 2023-11-10 PROCEDURE — 1090000001 HH PPS REVENUE CREDIT

## 2023-11-10 PROCEDURE — 1090000002 HH PPS REVENUE DEBIT

## 2023-11-11 PROCEDURE — 1090000001 HH PPS REVENUE CREDIT

## 2023-11-11 PROCEDURE — 1090000002 HH PPS REVENUE DEBIT

## 2023-11-12 PROCEDURE — 1090000002 HH PPS REVENUE DEBIT

## 2023-11-12 PROCEDURE — 1090000001 HH PPS REVENUE CREDIT

## 2023-11-13 ENCOUNTER — HOME CARE VISIT (OUTPATIENT)
Dept: HOME HEALTH SERVICES | Facility: HOME HEALTH | Age: 73
End: 2023-11-13
Payer: MEDICARE

## 2023-11-13 VITALS
OXYGEN SATURATION: 98 % | TEMPERATURE: 97.6 F | DIASTOLIC BLOOD PRESSURE: 65 MMHG | SYSTOLIC BLOOD PRESSURE: 125 MMHG | HEART RATE: 84 BPM | RESPIRATION RATE: 17 BRPM

## 2023-11-13 LAB
ATRIAL RATE: 82 BPM
Q ONSET: 224 MS
QRS COUNT: 11 BEATS
QRS DURATION: 88 MS
QT INTERVAL: 408 MS
QTC CALCULATION(BAZETT): 446 MS
QTC FREDERICIA: 433 MS
R AXIS: 56 DEGREES
T AXIS: -29 DEGREES
T OFFSET: 428 MS
VENTRICULAR RATE: 72 BPM

## 2023-11-13 PROCEDURE — 1090000002 HH PPS REVENUE DEBIT

## 2023-11-13 PROCEDURE — 1090000001 HH PPS REVENUE CREDIT

## 2023-11-13 PROCEDURE — G0157 HHC PT ASSISTANT EA 15: HCPCS | Mod: HHH

## 2023-11-13 SDOH — HEALTH STABILITY: PHYSICAL HEALTH
EXERCISE COMMENTS: 1 SET OF 15 EACH  TOTAL HIP THER EX :   SUPINE THER EX: GLUT QUAD SETS 5 SEC HOLDS, HIP ABD, HEEL SLIDES, SAQ, BRIDGES  SEATED THER EX: LEG EXT, LAQ, THERABAND RESISTIVE HIP ABD, BALL SQUEEZES HIP ADD.    STANDING THER EX: MARCHES, HIP FLEX, HIP ABD,

## 2023-11-13 SDOH — HEALTH STABILITY: PHYSICAL HEALTH: EXERCISE COMMENTS: HEEL/TOE RAISES, MINI SQUATS

## 2023-11-13 SDOH — HEALTH STABILITY: PHYSICAL HEALTH: EXERCISE TYPE: R THA PROTOCOL THER EX

## 2023-11-13 ASSESSMENT — ENCOUNTER SYMPTOMS
LIMITED RANGE OF MOTION: 1
PAIN LOCATION - PAIN SEVERITY: 1/10
SUBJECTIVE PAIN PROGRESSION: GRADUALLY IMPROVING
PERSON REPORTING PAIN: PATIENT
LOWEST PAIN SEVERITY IN PAST 24 HOURS: 1/10
PAIN SEVERITY GOAL: 0/10
PAIN LOCATION - EXACERBATING FACTORS: TIME OF DAY
MUSCLE WEAKNESS: 1
PAIN LOCATION: RIGHT HIP
PAIN LOCATION - RELIEVING FACTORS: PAIN MEDS, ICE
PAIN LOCATION - PAIN FREQUENCY: INTERMITTENT
PAIN: 1
HIGHEST PAIN SEVERITY IN PAST 24 HOURS: 5/10
OCCASIONAL FEELINGS OF UNSTEADINESS: 1

## 2023-11-13 ASSESSMENT — ACTIVITIES OF DAILY LIVING (ADL)
BATHING ASSESSED: 1
AMBULATION ASSISTANCE: STAND BY ASSIST
AMBULATION ASSISTANCE ON FLAT SURFACES: 1
BATHING_CURRENT_FUNCTION: STAND BY ASSIST
AMBULATION_DISTANCE/DURATION_TOLERATED: 65 FT
AMBULATION ASSISTANCE: 1

## 2023-11-14 PROCEDURE — 1090000002 HH PPS REVENUE DEBIT

## 2023-11-14 PROCEDURE — 1090000001 HH PPS REVENUE CREDIT

## 2023-11-15 ENCOUNTER — HOME CARE VISIT (OUTPATIENT)
Dept: HOME HEALTH SERVICES | Facility: HOME HEALTH | Age: 73
End: 2023-11-15
Payer: MEDICARE

## 2023-11-15 VITALS
SYSTOLIC BLOOD PRESSURE: 118 MMHG | HEART RATE: 59 BPM | TEMPERATURE: 97.7 F | DIASTOLIC BLOOD PRESSURE: 68 MMHG | OXYGEN SATURATION: 97 % | RESPIRATION RATE: 17 BRPM

## 2023-11-15 PROCEDURE — G0157 HHC PT ASSISTANT EA 15: HCPCS | Mod: HHH

## 2023-11-15 PROCEDURE — 1090000002 HH PPS REVENUE DEBIT

## 2023-11-15 PROCEDURE — 1090000001 HH PPS REVENUE CREDIT

## 2023-11-15 SDOH — HEALTH STABILITY: PHYSICAL HEALTH: EXERCISE COMMENTS: HEEL/TOE RAISES, MINI SQUATS

## 2023-11-15 SDOH — HEALTH STABILITY: PHYSICAL HEALTH: EXERCISE TYPE: R THA PROTOCOL THER EX

## 2023-11-15 ASSESSMENT — ENCOUNTER SYMPTOMS
PAIN LOCATION: RIGHT HIP
PAIN LOCATION - EXACERBATING FACTORS: TIME OF DAY, POSITIONING
LOWEST PAIN SEVERITY IN PAST 24 HOURS: 0/10
PERSON REPORTING PAIN: PATIENT
PAIN SEVERITY GOAL: 0/10
SUBJECTIVE PAIN PROGRESSION: RAPIDLY IMPROVING
LIMITED RANGE OF MOTION: 1
PAIN LOCATION - PAIN SEVERITY: 1/10
OCCASIONAL FEELINGS OF UNSTEADINESS: 0
MUSCLE WEAKNESS: 1
PAIN LOCATION - PAIN FREQUENCY: INTERMITTENT
HIGHEST PAIN SEVERITY IN PAST 24 HOURS: 3/10
PAIN: 1

## 2023-11-15 ASSESSMENT — ACTIVITIES OF DAILY LIVING (ADL)
BATHING ASSESSED: 1
AMBULATION_DISTANCE/DURATION_TOLERATED: 75
AMBULATION ASSISTANCE: STAND BY ASSIST
BATHING EQUIPMENT USED: SHOWER BENCH
AMBULATION ASSISTANCE ON FLAT SURFACES: 1
AMBULATION ASSISTANCE: 1
BATHING_CURRENT_FUNCTION: STAND BY ASSIST

## 2023-11-16 PROCEDURE — 1090000002 HH PPS REVENUE DEBIT

## 2023-11-16 PROCEDURE — 1090000001 HH PPS REVENUE CREDIT

## 2023-11-17 PROCEDURE — 1090000002 HH PPS REVENUE DEBIT

## 2023-11-17 PROCEDURE — 1090000001 HH PPS REVENUE CREDIT

## 2023-11-18 PROCEDURE — 1090000001 HH PPS REVENUE CREDIT

## 2023-11-18 PROCEDURE — 1090000002 HH PPS REVENUE DEBIT

## 2023-11-19 PROCEDURE — 1090000001 HH PPS REVENUE CREDIT

## 2023-11-19 PROCEDURE — 1090000002 HH PPS REVENUE DEBIT

## 2023-11-20 ENCOUNTER — HOME CARE VISIT (OUTPATIENT)
Dept: HOME HEALTH SERVICES | Facility: HOME HEALTH | Age: 73
End: 2023-11-20
Payer: MEDICARE

## 2023-11-20 VITALS
DIASTOLIC BLOOD PRESSURE: 65 MMHG | TEMPERATURE: 97.5 F | OXYGEN SATURATION: 98 % | RESPIRATION RATE: 17 BRPM | SYSTOLIC BLOOD PRESSURE: 122 MMHG | HEART RATE: 69 BPM

## 2023-11-20 PROCEDURE — 1090000002 HH PPS REVENUE DEBIT

## 2023-11-20 PROCEDURE — G0157 HHC PT ASSISTANT EA 15: HCPCS | Mod: HHH

## 2023-11-20 PROCEDURE — 1090000001 HH PPS REVENUE CREDIT

## 2023-11-20 SDOH — HEALTH STABILITY: PHYSICAL HEALTH: EXERCISE COMMENTS: , HEEL/TOE RAISES, MINI SQUATS

## 2023-11-20 SDOH — HEALTH STABILITY: PHYSICAL HEALTH
EXERCISE COMMENTS: 1 SET OF 15 EACH   TOTAL HIP THER EX :   SUPINE THER EX: GLUT QUAD SETS 5 SEC HOLDS, HIP ABD, HEEL SLIDES, SAQ, BRIDGES  SEATED THER EX: LEG EXT, LAQ, THERABAND RESISTIVE HIP ABD, BALL SQUEEZES HIP ADD.    STANDING THER EX: MARCHES, HIP FLEX, HIP ABD

## 2023-11-20 SDOH — HEALTH STABILITY: PHYSICAL HEALTH: EXERCISE TYPE: R THA PROTOCOL THER EX

## 2023-11-20 ASSESSMENT — ENCOUNTER SYMPTOMS
LIMITED RANGE OF MOTION: 1
PAIN SEVERITY GOAL: 0/10
PAIN LOCATION - PAIN FREQUENCY: INTERMITTENT
SUBJECTIVE PAIN PROGRESSION: RAPIDLY IMPROVING
MUSCLE WEAKNESS: 1
PAIN LOCATION: RIGHT HIP
LOWEST PAIN SEVERITY IN PAST 24 HOURS: 1/10
PAIN LOCATION - PAIN SEVERITY: 1/10
PERSON REPORTING PAIN: PATIENT
OCCASIONAL FEELINGS OF UNSTEADINESS: 1
PAIN: 1
HIGHEST PAIN SEVERITY IN PAST 24 HOURS: 5/10
PAIN LOCATION - EXACERBATING FACTORS: POSITIONING, TIME OF DAY

## 2023-11-20 ASSESSMENT — ACTIVITIES OF DAILY LIVING (ADL)
AMBULATION ASSISTANCE: STAND BY ASSIST
AMBULATION ASSISTANCE ON FLAT SURFACES: 1
AMBULATION ASSISTANCE: 1
AMBULATION_DISTANCE/DURATION_TOLERATED: 100 FT

## 2023-11-21 PROCEDURE — 1090000001 HH PPS REVENUE CREDIT

## 2023-11-21 PROCEDURE — 1090000002 HH PPS REVENUE DEBIT

## 2023-11-22 ENCOUNTER — HOME CARE VISIT (OUTPATIENT)
Dept: HOME HEALTH SERVICES | Facility: HOME HEALTH | Age: 73
End: 2023-11-22
Payer: MEDICARE

## 2023-11-22 VITALS
RESPIRATION RATE: 16 BRPM | OXYGEN SATURATION: 99 % | TEMPERATURE: 97.7 F | SYSTOLIC BLOOD PRESSURE: 122 MMHG | DIASTOLIC BLOOD PRESSURE: 61 MMHG | HEART RATE: 67 BPM

## 2023-11-22 PROCEDURE — G0157 HHC PT ASSISTANT EA 15: HCPCS | Mod: HHH

## 2023-11-22 PROCEDURE — 1090000001 HH PPS REVENUE CREDIT

## 2023-11-22 PROCEDURE — 1090000002 HH PPS REVENUE DEBIT

## 2023-11-22 ASSESSMENT — ENCOUNTER SYMPTOMS
SUBJECTIVE PAIN PROGRESSION: RAPIDLY IMPROVING
PAIN: 1
HIGHEST PAIN SEVERITY IN PAST 24 HOURS: 1/10
MUSCLE WEAKNESS: 1
PAIN SEVERITY GOAL: 0/10
PERSON REPORTING PAIN: PATIENT
LOWEST PAIN SEVERITY IN PAST 24 HOURS: 0/10
OCCASIONAL FEELINGS OF UNSTEADINESS: 0
PAIN LOCATION: RIGHT HIP

## 2023-11-22 ASSESSMENT — ACTIVITIES OF DAILY LIVING (ADL)
OASIS_M1830: 05
AMBULATION ASSISTANCE: STAND BY ASSIST
AMBULATION ASSISTANCE: 1
AMBULATION ASSISTANCE ON FLAT SURFACES: 1
AMBULATION_DISTANCE/DURATION_TOLERATED: 125 FT

## 2023-11-23 PROCEDURE — 1090000002 HH PPS REVENUE DEBIT

## 2023-11-23 PROCEDURE — 1090000001 HH PPS REVENUE CREDIT

## 2023-11-24 PROCEDURE — 1090000002 HH PPS REVENUE DEBIT

## 2023-11-24 PROCEDURE — 1090000001 HH PPS REVENUE CREDIT

## 2023-11-25 PROCEDURE — 1090000001 HH PPS REVENUE CREDIT

## 2023-11-25 PROCEDURE — 1090000002 HH PPS REVENUE DEBIT

## 2023-11-26 PROCEDURE — 1090000002 HH PPS REVENUE DEBIT

## 2023-11-26 PROCEDURE — 1090000001 HH PPS REVENUE CREDIT

## 2023-11-27 ENCOUNTER — HOME CARE VISIT (OUTPATIENT)
Dept: HOME HEALTH SERVICES | Facility: HOME HEALTH | Age: 73
End: 2023-11-27
Payer: MEDICARE

## 2023-11-27 VITALS
RESPIRATION RATE: 17 BRPM | SYSTOLIC BLOOD PRESSURE: 131 MMHG | DIASTOLIC BLOOD PRESSURE: 68 MMHG | HEART RATE: 72 BPM | OXYGEN SATURATION: 98 % | TEMPERATURE: 97.9 F

## 2023-11-27 PROCEDURE — 1090000001 HH PPS REVENUE CREDIT

## 2023-11-27 PROCEDURE — 1090000002 HH PPS REVENUE DEBIT

## 2023-11-27 PROCEDURE — G0157 HHC PT ASSISTANT EA 15: HCPCS | Mod: HHH

## 2023-11-27 SDOH — HEALTH STABILITY: PHYSICAL HEALTH
EXERCISE COMMENTS: 2 SETS OF 15, BLUE THERABAND  TOTAL HIP THER EX :   SUPINE THER EX: GLUT QUAD SETS 5 SEC HOLDS, HIP ABD, HEEL SLIDES, SAQ, BRIDGES  SEATED THER EX: LEG EXT, LAQ, THERABAND RESISTIVE HIP ABD, BALL SQUEEZES HIP ADD.    STANDING THER EX: MARCHES, HIP FL

## 2023-11-27 SDOH — HEALTH STABILITY: PHYSICAL HEALTH: EXERCISE COMMENTS: EX, HIP ABD, HEEL/TOE RAISES, MINI SQUATS

## 2023-11-27 SDOH — HEALTH STABILITY: PHYSICAL HEALTH: EXERCISE TYPE: R THA PROTOCOL THER EX

## 2023-11-27 ASSESSMENT — ENCOUNTER SYMPTOMS
LOWEST PAIN SEVERITY IN PAST 24 HOURS: 0/10
OCCASIONAL FEELINGS OF UNSTEADINESS: 0
PAIN SEVERITY GOAL: 0/10
DENIES PAIN: 1
HIGHEST PAIN SEVERITY IN PAST 24 HOURS: 1/10
SUBJECTIVE PAIN PROGRESSION: RAPIDLY IMPROVING

## 2023-11-27 ASSESSMENT — ACTIVITIES OF DAILY LIVING (ADL)
AMBULATION ASSISTANCE: 1
AMBULATION_DISTANCE/DURATION_TOLERATED: 145 FT
AMBULATION ASSISTANCE ON FLAT SURFACES: 1
AMBULATION ASSISTANCE: STAND BY ASSIST

## 2023-11-28 ENCOUNTER — TELEPHONE (OUTPATIENT)
Dept: INPATIENT UNIT | Facility: HOSPITAL | Age: 73
End: 2023-11-28
Payer: MEDICARE

## 2023-11-28 PROCEDURE — 1090000002 HH PPS REVENUE DEBIT

## 2023-11-28 PROCEDURE — 1090000001 HH PPS REVENUE CREDIT

## 2023-11-29 ENCOUNTER — HOME CARE VISIT (OUTPATIENT)
Dept: HOME HEALTH SERVICES | Facility: HOME HEALTH | Age: 73
End: 2023-11-29
Payer: MEDICARE

## 2023-11-29 VITALS
RESPIRATION RATE: 17 BRPM | DIASTOLIC BLOOD PRESSURE: 61 MMHG | HEART RATE: 56 BPM | OXYGEN SATURATION: 99 % | TEMPERATURE: 97.9 F | SYSTOLIC BLOOD PRESSURE: 126 MMHG

## 2023-11-29 PROCEDURE — 1090000002 HH PPS REVENUE DEBIT

## 2023-11-29 PROCEDURE — 1090000001 HH PPS REVENUE CREDIT

## 2023-11-29 PROCEDURE — G0157 HHC PT ASSISTANT EA 15: HCPCS | Mod: HHH

## 2023-11-29 SDOH — HEALTH STABILITY: PHYSICAL HEALTH: EXERCISE COMMENTS: EX, HIP ABD, HEEL/TOE RAISES, MINI SQUATS

## 2023-11-29 SDOH — HEALTH STABILITY: PHYSICAL HEALTH
EXERCISE COMMENTS: 2 SETS OF 10, BLUE THERABAND  TOTAL HIP THER EX :   SUPINE THER EX: GLUT QUAD SETS 5 SEC HOLDS, HIP ABD, HEEL SLIDES, SAQ, BRIDGES  SEATED THER EX: LEG EXT, LAQ, THERABAND RESISTIVE HIP ABD, BALL SQUEEZES HIP ADD.    STANDING THER EX: MARCHES, HIP FL

## 2023-11-29 SDOH — HEALTH STABILITY: PHYSICAL HEALTH: EXERCISE TYPE: R THA PROTOCOL THER EX

## 2023-11-29 ASSESSMENT — ACTIVITIES OF DAILY LIVING (ADL)
AMBULATION_DISTANCE/DURATION_TOLERATED: 200 FT
AMBULATION ASSISTANCE ON FLAT SURFACES: 1

## 2023-11-29 ASSESSMENT — ENCOUNTER SYMPTOMS
PERSON REPORTING PAIN: PATIENT
PAIN LOCATION - EXACERBATING FACTORS: POSITIONING , TIME OF DAY
PAIN SEVERITY GOAL: 0/10
LOWEST PAIN SEVERITY IN PAST 24 HOURS: 0/10
HIGHEST PAIN SEVERITY IN PAST 24 HOURS: 1/10
OCCASIONAL FEELINGS OF UNSTEADINESS: 0
PAIN LOCATION: LEFT HIP
PAIN LOCATION - PAIN SEVERITY: 1/10
MUSCLE WEAKNESS: 1
PAIN LOCATION - PAIN FREQUENCY: INTERMITTENT
PAIN: 1
SUBJECTIVE PAIN PROGRESSION: RAPIDLY IMPROVING

## 2023-11-30 PROCEDURE — 1090000001 HH PPS REVENUE CREDIT

## 2023-11-30 PROCEDURE — 1090000002 HH PPS REVENUE DEBIT

## 2023-12-01 PROCEDURE — 1090000002 HH PPS REVENUE DEBIT

## 2023-12-01 PROCEDURE — 1090000001 HH PPS REVENUE CREDIT

## 2023-12-02 PROCEDURE — 1090000002 HH PPS REVENUE DEBIT

## 2023-12-02 PROCEDURE — 1090000001 HH PPS REVENUE CREDIT

## 2023-12-03 ENCOUNTER — HOME CARE VISIT (OUTPATIENT)
Dept: HOME HEALTH SERVICES | Facility: HOME HEALTH | Age: 73
End: 2023-12-03
Payer: MEDICARE

## 2023-12-03 PROCEDURE — 1090000001 HH PPS REVENUE CREDIT

## 2023-12-03 PROCEDURE — 1090000002 HH PPS REVENUE DEBIT

## 2023-12-03 ASSESSMENT — ACTIVITIES OF DAILY LIVING (ADL)
OASIS_M1830: 00
HOME_HEALTH_OASIS: 00

## 2023-12-03 ASSESSMENT — PAIN SCALES - PAIN ASSESSMENT IN ADVANCED DEMENTIA (PAINAD)
CONSOLABILITY: 0 - NO NEED TO CONSOLE.
NEGVOCALIZATION: 0 - NONE.
NEGVOCALIZATION: 0
CONSOLABILITY: 0

## 2023-12-03 ASSESSMENT — ENCOUNTER SYMPTOMS
PERSON REPORTING PAIN: PATIENT
DENIES PAIN: 1

## 2023-12-04 ENCOUNTER — APPOINTMENT (OUTPATIENT)
Dept: HOME HEALTH SERVICES | Facility: HOME HEALTH | Age: 73
End: 2023-12-04
Payer: MEDICARE

## 2024-01-04 ENCOUNTER — OFFICE VISIT (OUTPATIENT)
Dept: CARDIOLOGY | Facility: HOSPITAL | Age: 74
End: 2024-01-04
Payer: MEDICARE

## 2024-01-04 VITALS
SYSTOLIC BLOOD PRESSURE: 134 MMHG | DIASTOLIC BLOOD PRESSURE: 85 MMHG | WEIGHT: 191.8 LBS | HEART RATE: 69 BPM | BODY MASS INDEX: 27.52 KG/M2 | OXYGEN SATURATION: 98 %

## 2024-01-04 DIAGNOSIS — R93.1 ABNORMAL HEART SCORE CT: ICD-10-CM

## 2024-01-04 DIAGNOSIS — I48.91 ATRIAL FIBRILLATION, UNSPECIFIED TYPE (MULTI): Primary | ICD-10-CM

## 2024-01-04 DIAGNOSIS — E78.5 DYSLIPIDEMIA: ICD-10-CM

## 2024-01-04 PROCEDURE — 1125F AMNT PAIN NOTED PAIN PRSNT: CPT | Performed by: INTERNAL MEDICINE

## 2024-01-04 PROCEDURE — 99214 OFFICE O/P EST MOD 30 MIN: CPT | Performed by: INTERNAL MEDICINE

## 2024-01-04 PROCEDURE — 93010 ELECTROCARDIOGRAM REPORT: CPT | Performed by: INTERNAL MEDICINE

## 2024-01-04 PROCEDURE — 93005 ELECTROCARDIOGRAM TRACING: CPT | Performed by: INTERNAL MEDICINE

## 2024-01-04 PROCEDURE — 1159F MED LIST DOCD IN RCRD: CPT | Performed by: INTERNAL MEDICINE

## 2024-01-04 PROCEDURE — 1036F TOBACCO NON-USER: CPT | Performed by: INTERNAL MEDICINE

## 2024-01-04 RX ORDER — ASPIRIN 81 MG/1
81 TABLET ORAL DAILY
COMMUNITY

## 2024-01-04 ASSESSMENT — ENCOUNTER SYMPTOMS
HEMATOLOGIC/LYMPHATIC NEGATIVE: 1
PSYCHIATRIC NEGATIVE: 1
ALLERGIC/IMMUNOLOGIC NEGATIVE: 1
CARDIOVASCULAR NEGATIVE: 1
RESPIRATORY NEGATIVE: 1
ENDOCRINE NEGATIVE: 1
MUSCULOSKELETAL NEGATIVE: 1
EYES NEGATIVE: 1
CONSTITUTIONAL NEGATIVE: 1
NEUROLOGICAL NEGATIVE: 1
GASTROINTESTINAL NEGATIVE: 1

## 2024-01-04 NOTE — PROGRESS NOTES
Chief Complaint:   Follow-up     History Of Present Illness:    Ok Chris is a 73 y.o. male presenting to follow-up.  Patient doing well from a cardiac standpoint.  Feeling better overall after getting both hips operated on last year.  Denies any angina, progressive dyspnea, dizziness, palpitations.  We had recommended him starting apixaban at last visit, ended up not starting it due to him undergoing these hip surgeries and starting and stopping--he has concerns about the bruising as well.     Last Recorded Vitals:  Vitals:    01/04/24 0849   BP: 134/85   Pulse: 69   SpO2: 98%   Weight: 87 kg (191 lb 12.8 oz)       Past Medical History:  He has a past medical history of Atrial fibrillation (CMS/[facility]), Encounter for removal of sutures (07/01/2020), Hyperlipidemia, unspecified (03/12/2019), Laceration without foreign body of unspecified ear, initial encounter (07/01/2020), OA (osteoarthritis), Personal history of other diseases of the circulatory system (06/23/2016), Personal history of other specified conditions (07/30/2020), Personal history of traumatic brain injury (06/24/2016), and Thoracic aortic aneurysm (CMS/[facility]).    Past Surgical History:  He has a past surgical history that includes Tonsillectomy (06/24/2016); Vasectomy (06/24/2016); Hernia repair (06/23/2016); Other surgical history (06/23/2016); Total hip arthroplasty (04/2023); and Eye surgery.      Social History:  He reports that he has never smoked. He has never used smokeless tobacco. He reports that he does not currently use alcohol. He reports current drug use. Drug: Marijuana.    Family History:  Family History   Problem Relation Name Age of Onset    Other (CARDIAC DISORDER) Mother      Heart failure Mother      Cancer Brother      Melanoma Brother      Heart attack Brother          Allergies:  Bee venom protein (honey bee)    Outpatient Medications:  Current Outpatient Medications   Medication Instructions    aspirin 81 mg, oral, Daily     Lumigan 0.01 % ophthalmic solution INSTILL 1 DROP INTO BOTH EYES ONCE PER DAY AT BEDTIME.    rosuvastatin (Crestor) 40 mg tablet TAKE 1 TABLET BY MOUTH EVERY DAY     Review of Systems   Constitutional: Negative.    HENT: Negative.     Eyes: Negative.    Respiratory: Negative.     Cardiovascular: Negative.    Gastrointestinal: Negative.    Endocrine: Negative.    Genitourinary: Negative.    Musculoskeletal: Negative.    Skin: Negative.    Allergic/Immunologic: Negative.    Neurological: Negative.    Hematological: Negative.    Psychiatric/Behavioral: Negative.        Physical Exam:  Constitutional:       Appearance: Healthy appearance. Not in distress.   Neck:      Vascular: No JVR. JVD normal.   Pulmonary:      Effort: Pulmonary effort is normal.      Breath sounds: Normal breath sounds. No wheezing. No rhonchi. No rales.   Chest:      Chest wall: Not tender to palpatation.   Cardiovascular:      Normal rate. Irregularly irregular rhythm.      Murmurs: There is no murmur.   Edema:     Peripheral edema absent.   Abdominal:      General: Bowel sounds are normal.      Palpations: Abdomen is soft.      Tenderness: There is no abdominal tenderness.   Musculoskeletal: Normal range of motion. Skin:     General: Skin is warm and dry.   Neurological:      General: No focal deficit present.      Mental Status: Alert and oriented to person, place and time.           Last Labs:  CBC -  Lab Results   Component Value Date    WBC 14.6 (H) 11/07/2023    HGB 10.9 (L) 11/07/2023    HCT 34.2 (L) 11/07/2023    MCV 91 11/07/2023     11/07/2023       CMP -  Lab Results   Component Value Date    CALCIUM 7.8 (L) 11/07/2023    PROT 6.6 10/31/2023    ALBUMIN 4.2 10/31/2023    AST 17 10/31/2023    ALT 12 10/31/2023    ALKPHOS 92 10/31/2023    BILITOT 0.6 10/31/2023       LIPID PANEL -   Lab Results   Component Value Date    CHOL 133 10/31/2023    TRIG 61 10/31/2023    HDL 42.1 10/31/2023    CHHDL 3.2 10/31/2023    LDLF 82 04/27/2022     "VLDL 12 10/31/2023    NHDL 91 10/31/2023       RENAL FUNCTION PANEL -   Lab Results   Component Value Date    GLUCOSE 129 (H) 11/07/2023     11/07/2023    K 4.5 11/07/2023     11/07/2023    CO2 29 11/07/2023    ANIONGAP 10 11/07/2023    BUN 14 11/07/2023    CREATININE 0.93 11/07/2023    GFRMALE 75 04/18/2023    CALCIUM 7.8 (L) 11/07/2023    ALBUMIN 4.2 10/31/2023        No results found for: \"BNP\", \"HGBA1C\"    Last Cardiology Tests:  ECG: Independently reviewed from today: Atrial fibrillation, rate 62, nonspecific T wave abnormalities     Echo:  6/2/2020  CONCLUSIONS:   1. The left ventricular systolic function is normal with a 55-60% estimated ejection fraction.   2. Spectral Doppler shows an abnormal pattern of left ventricular diastolic filling.   3. The left atrium is severely dilated.   4. The right atrium is severely dilated.   5. Mild to moderate mitral valve regurgitation.   6. Mild aortic valve stenosis.   7. There is mild aortic valve regurgitation.   8. The patient is in atrial fibrillation which may influence the estimate of left ventricular function and transvalvular flows.     Stress Test:  7/29/2020  IMPRESSION:  1. Normal myocardial perfusion study without evidence of ischemia or  prior infarction.  2. The left ventricle is normal in size.  3. Normal LV wall motion with an LV EF estimated at greater than 50%.    CAC score 11/2018:  LM: 0.  LAD: 1005.6.  LCx: 0.  RCA: 0.     Total: 1005.6.       Assessment/Plan   Very pleasant 74 yo male from home with h/o permanent a.fib, dyslipidemia, CAC score 1008 (2019).  Doing well from a cardiac standpoint.  We have had multiple discussions in the past regarding starting anticoagulation he has had resistance due to various factors.  I am going to refer him to Dr. Brown for assessment of candidacy for LAAO.  Continue current aspirin and rosuvastatin, follow-up in 1 year or sooner if needed.      Nathen Pena MD  "

## 2024-01-10 DIAGNOSIS — E78.5 HYPERLIPIDEMIA, UNSPECIFIED: ICD-10-CM

## 2024-01-11 RX ORDER — ROSUVASTATIN CALCIUM 40 MG/1
TABLET, COATED ORAL
Qty: 90 TABLET | Refills: 2 | Status: SHIPPED | OUTPATIENT
Start: 2024-01-11

## 2024-02-17 LAB
ATRIAL RATE: 53 BPM
ATRIAL RATE: 91 BPM
Q ONSET: 220 MS
Q ONSET: 222 MS
QRS COUNT: 10 BEATS
QRS COUNT: 12 BEATS
QRS DURATION: 90 MS
QRS DURATION: 94 MS
QT INTERVAL: 454 MS
QT INTERVAL: 456 MS
QTC CALCULATION(BAZETT): 460 MS
QTC CALCULATION(BAZETT): 495 MS
QTC FREDERICIA: 459 MS
QTC FREDERICIA: 482 MS
R AXIS: 46 DEGREES
R AXIS: 69 DEGREES
T AXIS: 3 DEGREES
T AXIS: 41 DEGREES
T OFFSET: 448 MS
T OFFSET: 449 MS
VENTRICULAR RATE: 62 BPM
VENTRICULAR RATE: 71 BPM

## 2024-03-05 NOTE — PROGRESS NOTES
PCP: Dr. Mayberry  Cardio: Dr. Jean FLETCHER was asked by Dr. Pena to evaluate this patient in consultation for evaluation of left atrial appendage closure.    The patient is a 73-year-old male with hyperlipidemia and paroxysmal atrial fibrillation. The patient has normal LVEF with left ventricular ejection fraction of 55 to 60% based on transthoracic echocardiogram June 2020.  Patient has no history of significant valvular heart disease.    At his functional baseline, the patient is active.  Patient states that he enjoys doing yard work, using tools and heavy equipment.  He sometimes gets on a ladder works out frequently with resistance training and even windsurfs on occasion. He is asymptomatic from his atrial arrhythmia.  He has been reluctant to start anticoagulant therapy for stroke risk reduction despite numerous conversations with Dr. Pena.    Patient has no history of CVA or TIA.  He is on an aspirin alone strategy.    He has presumed coronary artery disease with an elevated coronary calcium score and is on lipid-lowering therapy.    Given the patient's noncompliance with recommendations for anticoagulant therapy,  the patient is referred for consideration of left atrial appendage closure for stroke risk reduction.       ROS:  Constitutional: not feeling tired, not feeling poorly, no fever and no chills.   Eyes: no eyesight problems, no blurred vision, no diplopia, no eye pain, no purulent discharge from the eyes, eyes not red, no dryness of the eyes and no itching of the eyes.   ENT: no nosebleeds, no hearing loss, no tinnitus, no earache, no sore throat, no hoarseness, no swollen glands in the neck and no nasal discharge.   Cardiovascular: no intermittent leg claudication, no chest pain, no tightness or heavy pressure, no shortness of breath, no palpitations, no lower extremity edema, the heart rate was not slow, the heart rate was not fast and as noted in HPI.   Respiratory: no chronic cough, not  coughing up sputum,  no wheezing that is consistent with asthma, no asthma, no orthopnea and no postural nocturnal dyspnea.   Gastrointestinal: no change in bowel habits, no blood in stools, no diarrhea, no constipation, no nausea, no vomiting, no abdominal pain, no signs and symptoms of ulcer disease, no sydney colored stools and no intolerance to fatty foods.   Genitourinary: no hematuria,  no urinary frequency, no dysuria, no incontinence, no burning sensation during urination, no urinary hesitancy, no nocturia, no genital lesion, no testicular pain, urinary stream is not smaller and urinary stream does not start and stop.   Musculoskeletal: no arthralgias, no myalgias, no joint swelling, no joint stiffness, no muscle weakness, no back pain, no limb pain, no limb swelling and no difficulty walking.   Skin: no skin rashes, no change in skin color and pigmentation, no skin lesions and no skin lumps.   Neurological: no seizures, no frequent falls, no headaches, no dizziness, no tingling, no fainting and no limb weakness.   Psychiatric: no depression, not suicidal, no confusion, no memory lapses or loss, no anxiety, no personality change and no emotional problems.   Endocrine: no goiter, no thyroid disorder, no diabetes mellitus, no excessive thirst, no dry skin, no cold intolerance, no heat intolerance, no erectile dysfunction, no increased urinary frequency, no proptosis and no deepening of the voice.   Hematologic/Lymphatic: no bleeding issues.   All other systems have been reviewed and are negative for complaint.     Physical Exam:     Visit Vitals  Smoking Status Never        Constitutional: alert and in no acute distress.   Eyes: no erythema, swelling or discharge from the eye .   Ears, Nose, Mouth, and Throat: external inspection of ears and nose is normal , lips, teeth, and gums are normal with good dentition  and oropharynx normal with no erythema, edema, exudate or lesions .   Neck: neck is supple, symmetric,  trachea midline, no masses  and no thyromegaly .   Pulmonary: no increased work of breathing or signs of respiratory distress , lungs clear to auscultation. , normal percussion of chest  and chest palpation normal .   Cardiovascular: Irregular, irregular.  S1-S2 within normal limits.  No murmur,  no leg edema, non-displaced PMI, no S3 or S4  Abdomen: abdomen non-tender, no masses  and no hepatomegaly .           Skin:  no skin lesions          Neurologic: non-focal neurologic examination.      Psychiatric judgment and insight is normal , oriented to person, place and time , normal mood and affect .       Labs:    Results for orders placed or performed in visit on 01/04/24   ECG 12 lead (Clinic Performed)   Result Value Ref Range    Ventricular Rate 62 BPM    Atrial Rate 53 BPM    QRS Duration 94 ms    QT Interval 454 ms    QTC Calculation(Bazett) 460 ms    R Axis 69 degrees    T Axis 3 degrees    QRS Count 10 beats    Q Onset 222 ms    T Offset 449 ms    QTC Fredericia 459 ms          Medications:    Current Outpatient Medications   Medication Instructions    aspirin 81 mg, oral, Daily    Lumigan 0.01 % ophthalmic solution INSTILL 1 DROP INTO BOTH EYES ONCE PER DAY AT BEDTIME.    rosuvastatin (Crestor) 40 mg tablet TAKE 1 TABLET BY MOUTH EVERY DAY          Assessment:      This is a 73-year-old male with hyperlipidemia and paroxysmal atrial fibrillation.  Despite numerous recommendations, the patient has been unwilling to start anticoagulant therapy for stroke risk reduction.  The patient also has a very active lifestyle.    The CHADS-VASC score is 3 and HAS-BLED score is 1. Given the patient's noncompliance with anticoagulant therapy despite an elevated [id number] score in addition to his very active lifestyle, the patient is a reasonable candidate for consideration of left atrial appendage occluder placement.    Today we discussed the left atrial appendage closure procedure. The patient was given written  educational handout materials and watched an educational video. All risks, benefits and alternative were discussed.     The risks discussed included but were not limited to vascular complications, sedation related complications, risk of MI, CVA, device embolization, pericardial tamponade and death. The patient verbalized understanding and decided to proceed.        Plan will be for preprocedural cardiac CT. Following device implant, strategy will be for dual antiplatelet therapy with aspirin and clopidogrel for 6 months then aspirin for life.     Thank you, Dr. Pena, for this consultation and for allowing me to participate in the care of this patient.

## 2024-03-06 ENCOUNTER — OFFICE VISIT (OUTPATIENT)
Dept: CARDIOLOGY | Facility: CLINIC | Age: 74
End: 2024-03-06
Payer: MEDICARE

## 2024-03-06 VITALS
OXYGEN SATURATION: 97 % | HEIGHT: 70 IN | HEART RATE: 58 BPM | SYSTOLIC BLOOD PRESSURE: 131 MMHG | WEIGHT: 189 LBS | BODY MASS INDEX: 27.06 KG/M2 | DIASTOLIC BLOOD PRESSURE: 81 MMHG

## 2024-03-06 DIAGNOSIS — I48.91 ATRIAL FIBRILLATION, UNSPECIFIED TYPE (MULTI): ICD-10-CM

## 2024-03-06 PROCEDURE — 1036F TOBACCO NON-USER: CPT | Performed by: INTERNAL MEDICINE

## 2024-03-06 PROCEDURE — 99214 OFFICE O/P EST MOD 30 MIN: CPT | Performed by: INTERNAL MEDICINE

## 2024-03-06 PROCEDURE — 99204 OFFICE O/P NEW MOD 45 MIN: CPT | Performed by: INTERNAL MEDICINE

## 2024-03-06 PROCEDURE — 1126F AMNT PAIN NOTED NONE PRSNT: CPT | Performed by: INTERNAL MEDICINE

## 2024-03-06 PROCEDURE — 1159F MED LIST DOCD IN RCRD: CPT | Performed by: INTERNAL MEDICINE

## 2024-03-06 ASSESSMENT — ENCOUNTER SYMPTOMS
OCCASIONAL FEELINGS OF UNSTEADINESS: 0
LOSS OF SENSATION IN FEET: 0
DEPRESSION: 0

## 2024-03-06 ASSESSMENT — COLUMBIA-SUICIDE SEVERITY RATING SCALE - C-SSRS
1. IN THE PAST MONTH, HAVE YOU WISHED YOU WERE DEAD OR WISHED YOU COULD GO TO SLEEP AND NOT WAKE UP?: NO
2. HAVE YOU ACTUALLY HAD ANY THOUGHTS OF KILLING YOURSELF?: NO
6. HAVE YOU EVER DONE ANYTHING, STARTED TO DO ANYTHING, OR PREPARED TO DO ANYTHING TO END YOUR LIFE?: NO

## 2024-03-06 ASSESSMENT — PAIN SCALES - GENERAL: PAINLEVEL: 0-NO PAIN

## 2024-03-06 NOTE — CONSULTS
Service:   Service: Medicine     Consult:  Consult requested by (Attending Name): CHRISSY COCHRAN   Reason: medical management s/p ALBERTO     History of Present Illness:   HPI:    JEANINE GARCIA is a 73 year old Male with PMH of thoracic aneurysm, HLD, Afib and OA that presented for scheduled left hip arthroplasty. Patient failed outpatient conservative  measure of OA and therefore has surgery scheduled. Medicine was consulted to assist with willam-operative medical management. Patient has no complaints at this time. Denies any recent fever/chills, chest pain, dyspnea, N/V, abdominal pain, and changes in  urinary or bowel habits.    PMH: thoracic aneurysm, HLD, Afib, OA  PSH: tonsillectomy, hernia repair, vascectomy  SH: occasional ETOH, rare tobacco use, denies illicit drug use  FH: Brother-CAD, denies DM, CVA, cancer    Review Family/Social History and ROS:   Social History:    Smoking Status: never smoker  (1)     Constitutional: NEGATIVE: Fever, Chills     Eyes: NEGATIVE: Vision Loss/ Change     ENMT: NEGATIVE: Throat Pain     Respiratory: NEGATIVE: Dry Cough, Productive Cough,  Shortness of Breath     Cardiac: NEGATIVE: Chest Pain, Dyspnea on Exertion,  Palpitations     Gastrointestinal: NEGATIVE: Nausea, Vomiting, Diarrhea,  Constipation, Abdominal Pain     Genitourinary: NEGATIVE: Dysuria     Musculoskeletal: POSITIVE: Decreased ROM, Pain, Stiffness, Weakness     Neurological: NEGATIVE: Dizziness, Headache     Skin: NEGATIVE: Rash              Allergies:  ·  NKDA :   ·  Bee  Stings: Unknown    Objective:     Objective Information:        T   P  R  BP   MAP  SpO2   Value  37  53  18  131/60         Date/Time 4/17 12:15 4/17 12:15 4/17 12:15 4/17 12:15       Range  (37C - 37C )  (53 - 53 )  (18 - 18 )  (131 - 131 )/ (60 - 60 )      Highest temp of 37 C was recorded at 4/17 12:15    Physical Exam by System:    Constitutional: Well developed, no apparent distress,  alert and oriented x3   Head/Neck: Normocephalic,  atraumatic   Respiratory/Thorax: CTAB with good inspiratory effort,  no wheezing/crackles/rales   Cardiovascular: RRR with normal S1 and S2, no murmurs   Gastrointestinal: Soft, nontender, nondistended,  BSx4   Musculoskeletal: ROM intact, normal strength except  LLE in willam-operative setting   Extremities: No pretibial edema, good and equal pedal  pulses   Neurological: Alert and oriented x3   Psychological: Appropriate mood and behavior   Skin: Warm and dry, post-op dressing on LLE     Refresh Home Medications List:  ·  Select to Refresh Home Medication List Refresh Home Medications     Medications:    Medications:          Continuous Medications       --------------------------------    1. Lactated Ringers Infusion:  1000  mL  IntraVenous  <Continuous>         Scheduled Medications       --------------------------------    1. Acetaminophen:  650  mg  Oral  Every 6 Hours    2. Ascorbic Acid:  500  mg  Oral  2 Times a Day    3. Docusate:  100  mg  Oral  2 Times a Day    4. Latanoprost 0.005% Ophthalmic:  1  drop(s)  Both Eyes  At Bedtime    5. Pantoprazole:  40  mg  Oral  Daily    6. Polyethylene Glycol:  17  gram(s)  Oral  2 Times a Day    7. Rosuvastatin:  40  mg  Oral  Daily         PRN Medications       --------------------------------    1. Carisoprodol:  350  mg  Oral  Every 8 Hours    2. diphenhydrAMINE Injectable:  12.5  mg  IntraVenous Push  Every 6 Hours    3. Gabapentin:  300  mg  Oral  3 Times a Day    4. HYDROmorphone Injectable:  0.4  mg  IntraVenous Push  Every 2 Hours    5. Ketorolac Injectable:  15  mg  IntraVenous Push  Every 6 Hours    6. Ondansetron Injectable:  4  mg  IntraVenous Push  Every 6 Hours    7. oxyCODONE Immediate Release:  5  mg  Oral  Every 4 Hours    8. oxyCODONE Immediate Release:  10  mg  Oral  Every 4 Hours    9. Sore Throat Lozenge:  1  lozenge(s)  Oral  Every 4 Hours    10. traMADol:  50  mg  Oral  Every 6 Hours          Assessment:    74yo CM with PMH of thoracic aneurysm,  "HLD, Afib and OA that presented for scheduled left hip arthroplasty. Medicine was consulted to assist with willam-operative medical  management    Left hip osteoarthritis  - POD#0 direct anterior approach left total hip arthroplasty  - continue multimodal pain mgmt  - start ASA 325mg BID for DVT proph per ortho  - PT/OT consulted, appreciate recs    Atrial fibrillation  - not on any rate/rhythm controlling meds  - per recent cardiology appt (Jan 2023), was recommended eliquis but patient preferred to hold off since he knew he had surgery coming up  - per patient, plan to finish 30days prophylaxis and then see cardiology about starting eliquis  - emphasized to patient importance of discussion with cardiology and need for blood thinners with chadsvasc score but not while on aspirin, he expressed understanding    Hyperlipidemia  - continue home rosuvastatin    DVT Proph: ASA 325mg BID per ortho    Dispo: Patient appears medically stable, discharge per primary surgical service. Will follow.    Consult Status:  Consult Order ID: [id number]       Electronic Signatures:  Violeta Truong)  (Signed 17-Apr-2023 16:42)   Authored: Service, History of Present Illness, Review  Family/Social History and ROS, Allergies, Objective, Assessment/Recommendations, Note Completion      Last Updated: 17-Apr-2023 16:42 by Violeta Truong ()    References:  1.  Data Referenced From \"Patient Profile - Adult v2\" 17-Apr-2023 12:24   "

## 2024-03-07 ENCOUNTER — LAB (OUTPATIENT)
Dept: LAB | Facility: LAB | Age: 74
End: 2024-03-07
Payer: MEDICARE

## 2024-03-07 DIAGNOSIS — I48.91 ATRIAL FIBRILLATION, UNSPECIFIED TYPE (MULTI): ICD-10-CM

## 2024-03-07 LAB
ANION GAP SERPL CALC-SCNC: 12 MMOL/L (ref 10–20)
BUN SERPL-MCNC: 13 MG/DL (ref 6–23)
CALCIUM SERPL-MCNC: 9.3 MG/DL (ref 8.6–10.3)
CHLORIDE SERPL-SCNC: 103 MMOL/L (ref 98–107)
CO2 SERPL-SCNC: 29 MMOL/L (ref 21–32)
CREAT SERPL-MCNC: 0.97 MG/DL (ref 0.5–1.3)
EGFRCR SERPLBLD CKD-EPI 2021: 81 ML/MIN/1.73M*2
ERYTHROCYTE [DISTWIDTH] IN BLOOD BY AUTOMATED COUNT: 13.2 % (ref 11.5–14.5)
GLUCOSE SERPL-MCNC: 94 MG/DL (ref 74–99)
HCT VFR BLD AUTO: 46 % (ref 41–52)
HGB BLD-MCNC: 14.3 G/DL (ref 13.5–17.5)
MCH RBC QN AUTO: 28.5 PG (ref 26–34)
MCHC RBC AUTO-ENTMCNC: 31.1 G/DL (ref 32–36)
MCV RBC AUTO: 92 FL (ref 80–100)
NRBC BLD-RTO: 0 /100 WBCS (ref 0–0)
PLATELET # BLD AUTO: 211 X10*3/UL (ref 150–450)
POTASSIUM SERPL-SCNC: 5 MMOL/L (ref 3.5–5.3)
RBC # BLD AUTO: 5.01 X10*6/UL (ref 4.5–5.9)
SODIUM SERPL-SCNC: 139 MMOL/L (ref 136–145)
WBC # BLD AUTO: 5.7 X10*3/UL (ref 4.4–11.3)

## 2024-03-07 PROCEDURE — 36415 COLL VENOUS BLD VENIPUNCTURE: CPT

## 2024-03-07 PROCEDURE — 80048 BASIC METABOLIC PNL TOTAL CA: CPT

## 2024-03-07 PROCEDURE — 85027 COMPLETE CBC AUTOMATED: CPT

## 2024-03-18 PROBLEM — I48.91 ATRIAL FIBRILLATION, UNSPECIFIED TYPE (MULTI): Status: ACTIVE | Noted: 2024-03-18

## 2024-03-18 RX ORDER — ONDANSETRON 4 MG/1
4 TABLET, FILM COATED ORAL EVERY 8 HOURS PRN
Status: CANCELLED | OUTPATIENT
Start: 2024-03-18

## 2024-03-18 RX ORDER — NAPROXEN SODIUM 220 MG/1
81 TABLET, FILM COATED ORAL DAILY
Status: CANCELLED | OUTPATIENT
Start: 2024-03-19

## 2024-03-18 RX ORDER — CLOPIDOGREL BISULFATE 75 MG/1
75 TABLET ORAL DAILY
Status: CANCELLED | OUTPATIENT
Start: 2024-03-18 | End: 2025-03-18

## 2024-03-18 RX ORDER — SENNOSIDES 8.6 MG/1
2 TABLET ORAL 2 TIMES DAILY
Status: CANCELLED | OUTPATIENT
Start: 2024-03-18

## 2024-03-18 RX ORDER — ACETAMINOPHEN 325 MG/1
650 TABLET ORAL EVERY 6 HOURS PRN
Status: CANCELLED | OUTPATIENT
Start: 2024-03-18

## 2024-03-18 RX ORDER — ONDANSETRON HYDROCHLORIDE 2 MG/ML
4 INJECTION, SOLUTION INTRAVENOUS EVERY 8 HOURS PRN
Status: CANCELLED | OUTPATIENT
Start: 2024-03-18

## 2024-03-18 NOTE — DISCHARGE INSTRUCTIONS
Watchman Discharge Instructions  Anticoagulation Plan:  You are being discharged on Aspirin and Plavix for 6 months (Plavix will be stopped after 6 months and you will remain on 81mg Aspirin for life).  You will have a 4 month CTA to assess the effectiveness of the Watchman Device.     General Instructions:  DO NOT drink any alcoholic drinks or take any non-prescriptive medications that contain alcohol for the first 24 hours.   DO NOT make any important decisions for the first 24 hours.  Activity:  You are advised to go directly home from the hospital.   DO NOT lift anything heavier than 10 pounds for one week, this allows for proper healing of the groin.   No excessive exercise or treadmill use for one week. You may walk and do stairs, slowly.   No sexual activities for 24 hours after you arrive home.    Wound Care:  If slight bleeding should occur at site, lie down and have someone apply firm pressure just above the puncture site for 5 minutes.  If it continues or is profuse, call 911. Always notify your doctor if bleeding occurs.   Keep site clean and dry. Let air dry or you may use a simple bandaid.   Gently cleanse the puncture site in your groin with soap and water only.   You may experience some tenderness, bruising or minimal inflammation.  If you have any concerns, you may contact the Cath Lab or if any of these symptoms become excessive, contact your cardiologist or go to the emergency room.   No tub baths, soaking, or swimming for one week.   May shower the next day after your procedure.    Diet:  You may resume your normal diet. However it is better to start with liquids such as juices then soup and crackers, and gradually work up to solid foods.    Other Instructions:  If you develop difficulty breathing, rash, hives, severe nausea, vomiting, light-headedness or any signs of infection, immediately contact your doctor and go to the nearest emergency room.   You must take your aspirin, clopidogrel  (Plavix), prasugrel (Effient), or ticagrelor (Brilinta) every day without missing a single dose.  If you are getting low on these medications, contact your physician immediately for a refill.  - CALL 911 IF YOU HAVE ANY OF THE SIGNS AND SYMPTOMS OF HEART FAILURE: 1. Chest pain 2. Significant Shortness of breath 3. Fainting.     Call Provider If (Home-going Patients):  Breathing faster than normal.   Breathing harder than normal or having retractions.   Fever of 100.4 F (38 C) or higher.   Chills.   Drinking less than normal.   Urinating less than normal, over 1 day.   Acting very sleepy and difficult to awaken.   Vomiting (throwing up) and not able to eat or drink for 12 hours.   3 or more loose, watery bowel movements in 24 hours (diarrhea).   Any new concerning symptoms.

## 2024-03-19 ENCOUNTER — APPOINTMENT (OUTPATIENT)
Dept: CARDIOLOGY | Facility: HOSPITAL | Age: 74
DRG: 274 | End: 2024-03-19
Payer: MEDICARE

## 2024-03-19 ENCOUNTER — HOSPITAL ENCOUNTER (OUTPATIENT)
Dept: RADIOLOGY | Facility: HOSPITAL | Age: 74
Discharge: HOME | DRG: 274 | End: 2024-03-19
Payer: MEDICARE

## 2024-03-19 ENCOUNTER — HOSPITAL ENCOUNTER (INPATIENT)
Facility: HOSPITAL | Age: 74
LOS: 1 days | Discharge: HOME | DRG: 274 | End: 2024-03-19
Attending: INTERNAL MEDICINE | Admitting: INTERNAL MEDICINE
Payer: MEDICARE

## 2024-03-19 VITALS
DIASTOLIC BLOOD PRESSURE: 70 MMHG | HEART RATE: 52 BPM | SYSTOLIC BLOOD PRESSURE: 124 MMHG | RESPIRATION RATE: 16 BRPM | OXYGEN SATURATION: 100 %

## 2024-03-19 DIAGNOSIS — Z09 POSTOP CHECK: ICD-10-CM

## 2024-03-19 DIAGNOSIS — I48.21 PERMANENT ATRIAL FIBRILLATION (MULTI): ICD-10-CM

## 2024-03-19 DIAGNOSIS — Z95.818 PRESENCE OF OTHER CARDIAC IMPLANTS AND GRAFTS: ICD-10-CM

## 2024-03-19 DIAGNOSIS — I48.91 ATRIAL FIBRILLATION, UNSPECIFIED TYPE (MULTI): ICD-10-CM

## 2024-03-19 DIAGNOSIS — I48.20 CHRONIC ATRIAL FIBRILLATION (MULTI): ICD-10-CM

## 2024-03-19 DIAGNOSIS — I48.91 ATRIAL FIBRILLATION, UNSPECIFIED TYPE (MULTI): Primary | ICD-10-CM

## 2024-03-19 DIAGNOSIS — Z01.810 PREOPERATIVE CARDIOVASCULAR EXAMINATION: ICD-10-CM

## 2024-03-19 PROCEDURE — 2500000005 HC RX 250 GENERAL PHARMACY W/O HCPCS: Performed by: INTERNAL MEDICINE

## 2024-03-19 PROCEDURE — 99152 MOD SED SAME PHYS/QHP 5/>YRS: CPT | Performed by: INTERNAL MEDICINE

## 2024-03-19 PROCEDURE — C1760 CLOSURE DEV, VASC: HCPCS | Performed by: INTERNAL MEDICINE

## 2024-03-19 PROCEDURE — 93662 INTRACARDIAC ECG (ICE): CPT | Performed by: INTERNAL MEDICINE

## 2024-03-19 PROCEDURE — G0269 OCCLUSIVE DEVICE IN VEIN ART: HCPCS | Mod: TC,59 | Performed by: INTERNAL MEDICINE

## 2024-03-19 PROCEDURE — 7100000009 HC PHASE TWO TIME - INITIAL BASE CHARGE: Performed by: INTERNAL MEDICINE

## 2024-03-19 PROCEDURE — 75572 CT HRT W/3D IMAGE: CPT | Performed by: RADIOLOGY

## 2024-03-19 PROCEDURE — C1889 IMPLANT/INSERT DEVICE, NOC: HCPCS | Performed by: INTERNAL MEDICINE

## 2024-03-19 PROCEDURE — 93308 TTE F-UP OR LMTD: CPT

## 2024-03-19 PROCEDURE — C1759 CATH, INTRA ECHOCARDIOGRAPHY: HCPCS | Performed by: INTERNAL MEDICINE

## 2024-03-19 PROCEDURE — 33340 PERQ CLSR TCAT L ATR APNDGE: CPT | Performed by: INTERNAL MEDICINE

## 2024-03-19 PROCEDURE — 1210000001 HC SEMI-PRIVATE ROOM DAILY

## 2024-03-19 PROCEDURE — 93308 TTE F-UP OR LMTD: CPT | Performed by: INTERNAL MEDICINE

## 2024-03-19 PROCEDURE — 93010 ELECTROCARDIOGRAM REPORT: CPT | Performed by: INTERNAL MEDICINE

## 2024-03-19 PROCEDURE — 2720000007 HC OR 272 NO HCPCS: Performed by: INTERNAL MEDICINE

## 2024-03-19 PROCEDURE — 7100000010 HC PHASE TWO TIME - EACH INCREMENTAL 1 MINUTE: Performed by: INTERNAL MEDICINE

## 2024-03-19 PROCEDURE — 75572 CT HRT W/3D IMAGE: CPT

## 2024-03-19 PROCEDURE — 02L73DK OCCLUSION OF LEFT ATRIAL APPENDAGE WITH INTRALUMINAL DEVICE, PERCUTANEOUS APPROACH: ICD-10-PCS | Performed by: INTERNAL MEDICINE

## 2024-03-19 PROCEDURE — 2550000001 HC RX 255 CONTRASTS: Performed by: INTERNAL MEDICINE

## 2024-03-19 PROCEDURE — 2780000003 HC OR 278 NO HCPCS: Performed by: INTERNAL MEDICINE

## 2024-03-19 PROCEDURE — 2500000001 HC RX 250 WO HCPCS SELF ADMINISTERED DRUGS (ALT 637 FOR MEDICARE OP): Performed by: INTERNAL MEDICINE

## 2024-03-19 PROCEDURE — 99153 MOD SED SAME PHYS/QHP EA: CPT | Performed by: INTERNAL MEDICINE

## 2024-03-19 PROCEDURE — C1893 INTRO/SHEATH, FIXED,NON-PEEL: HCPCS | Performed by: INTERNAL MEDICINE

## 2024-03-19 PROCEDURE — 85347 COAGULATION TIME ACTIVATED: CPT

## 2024-03-19 PROCEDURE — 93308 TTE F-UP OR LMTD: CPT | Mod: MUE

## 2024-03-19 PROCEDURE — 85347 COAGULATION TIME ACTIVATED: CPT | Performed by: INTERNAL MEDICINE

## 2024-03-19 PROCEDURE — 93005 ELECTROCARDIOGRAM TRACING: CPT

## 2024-03-19 PROCEDURE — 2500000004 HC RX 250 GENERAL PHARMACY W/ HCPCS (ALT 636 FOR OP/ED): Performed by: INTERNAL MEDICINE

## 2024-03-19 PROCEDURE — C1894 INTRO/SHEATH, NON-LASER: HCPCS | Performed by: INTERNAL MEDICINE

## 2024-03-19 DEVICE — LEFT ATRIAL APPENDAGE CLOSURE DEVICE WITH DELIVERY SYSTEM
Type: IMPLANTABLE DEVICE | Site: HEART | Status: FUNCTIONAL
Brand: WATCHMAN FLX™

## 2024-03-19 RX ORDER — CEFAZOLIN SODIUM 2 G/100ML
2 INJECTION, SOLUTION INTRAVENOUS ONCE
Status: DISCONTINUED | OUTPATIENT
Start: 2024-03-19 | End: 2024-03-19 | Stop reason: HOSPADM

## 2024-03-19 RX ORDER — MIDAZOLAM HYDROCHLORIDE 1 MG/ML
INJECTION, SOLUTION INTRAMUSCULAR; INTRAVENOUS AS NEEDED
Status: DISCONTINUED | OUTPATIENT
Start: 2024-03-19 | End: 2024-03-19 | Stop reason: HOSPADM

## 2024-03-19 RX ORDER — FENTANYL CITRATE 50 UG/ML
INJECTION, SOLUTION INTRAMUSCULAR; INTRAVENOUS AS NEEDED
Status: DISCONTINUED | OUTPATIENT
Start: 2024-03-19 | End: 2024-03-19 | Stop reason: HOSPADM

## 2024-03-19 RX ORDER — HEPARIN SODIUM 1000 [USP'U]/ML
INJECTION, SOLUTION INTRAVENOUS; SUBCUTANEOUS AS NEEDED
Status: DISCONTINUED | OUTPATIENT
Start: 2024-03-19 | End: 2024-03-19 | Stop reason: HOSPADM

## 2024-03-19 RX ORDER — LIDOCAINE HYDROCHLORIDE 20 MG/ML
INJECTION, SOLUTION INFILTRATION; PERINEURAL AS NEEDED
Status: DISCONTINUED | OUTPATIENT
Start: 2024-03-19 | End: 2024-03-19 | Stop reason: HOSPADM

## 2024-03-19 RX ORDER — PROTAMINE SULFATE 10 MG/ML
INJECTION, SOLUTION INTRAVENOUS CONTINUOUS PRN
Status: COMPLETED | OUTPATIENT
Start: 2024-03-19 | End: 2024-03-19

## 2024-03-19 RX ORDER — ASPIRIN 325 MG
TABLET ORAL AS NEEDED
Status: DISCONTINUED | OUTPATIENT
Start: 2024-03-19 | End: 2024-03-19 | Stop reason: HOSPADM

## 2024-03-19 RX ORDER — NAPROXEN SODIUM 220 MG/1
324 TABLET, FILM COATED ORAL ONCE
Status: DISCONTINUED | OUTPATIENT
Start: 2024-03-19 | End: 2024-03-19 | Stop reason: HOSPADM

## 2024-03-19 RX ORDER — ROSUVASTATIN CALCIUM 40 MG/1
40 TABLET, COATED ORAL NIGHTLY
Status: DISCONTINUED | OUTPATIENT
Start: 2024-03-19 | End: 2024-03-19 | Stop reason: HOSPADM

## 2024-03-19 RX ORDER — SODIUM CHLORIDE, SODIUM LACTATE, POTASSIUM CHLORIDE, CALCIUM CHLORIDE 600; 310; 30; 20 MG/100ML; MG/100ML; MG/100ML; MG/100ML
75 INJECTION, SOLUTION INTRAVENOUS CONTINUOUS
Status: SHIPPED | OUTPATIENT
Start: 2024-03-19 | End: 2024-03-19

## 2024-03-19 RX ORDER — CLOPIDOGREL BISULFATE 300 MG/1
TABLET, FILM COATED ORAL AS NEEDED
Status: DISCONTINUED | OUTPATIENT
Start: 2024-03-19 | End: 2024-03-19 | Stop reason: HOSPADM

## 2024-03-19 RX ORDER — CLOPIDOGREL BISULFATE 75 MG/1
75 TABLET ORAL DAILY
Qty: 180 TABLET | Refills: 0 | Status: SHIPPED | OUTPATIENT
Start: 2024-03-19 | End: 2024-05-01 | Stop reason: SDUPTHER

## 2024-03-19 RX ORDER — CEFAZOLIN 1 G/1
INJECTION, POWDER, FOR SOLUTION INTRAVENOUS AS NEEDED
Status: DISCONTINUED | OUTPATIENT
Start: 2024-03-19 | End: 2024-03-19 | Stop reason: HOSPADM

## 2024-03-19 RX ADMIN — IOHEXOL 70 ML: 350 INJECTION, SOLUTION INTRAVENOUS at 12:07

## 2024-03-19 ASSESSMENT — COLUMBIA-SUICIDE SEVERITY RATING SCALE - C-SSRS
6. HAVE YOU EVER DONE ANYTHING, STARTED TO DO ANYTHING, OR PREPARED TO DO ANYTHING TO END YOUR LIFE?: NO
1. IN THE PAST MONTH, HAVE YOU WISHED YOU WERE DEAD OR WISHED YOU COULD GO TO SLEEP AND NOT WAKE UP?: NO
2. HAVE YOU ACTUALLY HAD ANY THOUGHTS OF KILLING YOURSELF?: NO

## 2024-03-19 NOTE — DISCHARGE SUMMARY
Discharge Diagnosis  Atrial fibrillation (CMS/[facility])        Test Results Pending At Discharge  Pending Labs       No current pending labs.            Hospital Course   Description of the Procedure:   S/p JUHI closure     Access: Dual right femoral vein access s/p proglide and vascade closure devices.      Device: After confirmation of the device position on fluoroscopy and ICE, anchor with a tug test, compression and seal a 35 mm Watchman was successfully deployed without any immediate complications.      No effusion on ICE was present pre and post watchman deployment.      Recommendations:   ASA and Plavix for 6 months followed by ASA for life   CT in 4 months  Access site monitoring.   TTE today  Likely discharge home today once recovery and monitoring period is complete.        Complications:   None     Pertinent Physical Exam At Time of Discharge  AO x 3  CVS- normal s1, s1, no murmurs  Resp -CTAB  Abd- Soft, NT, ND  Neuro  No gross motor. Sensory deficits   Groin access sites no hematoma, swelling   No LE edema       Home Medications     Medication List      START taking these medications     clopidogrel 75 mg tablet; Commonly known as: Plavix; Take 1 tablet (75   mg) by mouth once daily.     CONTINUE taking these medications     aspirin 81 mg EC tablet   Lumigan 0.01 % ophthalmic solution; Generic drug: bimatoprost   rosuvastatin 40 mg tablet; Commonly known as: Crestor; TAKE 1 TABLET BY   MOUTH EVERY DAY       Outpatient Follow-Up  Future Appointments   Date Time Provider Department Center   4/3/2024 11:00 AM ANGELINA Hartley-CNP [address]   7/19/2024 10:00 AM [facility] CT 1 [facility]   1/9/2025  8:00 AM Nathen Pena MD [facility]       Carlton Zavala MD

## 2024-03-19 NOTE — POST-PROCEDURE NOTE
Physician Transition of Care Summary  Invasive Cardiovascular Lab    Procedure Date: 3/19/2024  Attending:    * Ye Brown - Primary  Resident/Fellow/Other Assistant: Surgeon(s) and Role:     * Nelson Garrison MD - Fellow     * Carlton Zavala MD - Fellow    Indications:   Pre-op Diagnosis     * Atrial fibrillation, unspecified type (CMS/HCC) [I48.91]    Post-procedure diagnosis:   Post-op Diagnosis     * Atrial fibrillation, unspecified type (CMS/HCC) [I48.91]    Procedure(s):   LAAO (Left Atrial Appendage Occlusion)  80723 - [facility] PERQ CLSR TCAT L ATR APNDGE W/ENDOCARDIAL IMPLNT    VA PERQ CLSR TCAT L ATR APNDGE W/ENDOCARDIAL IMPLNT [[id number]]        Description of the Procedure:   S/p JUHI closure    Access: Dual right femoral vein access s/p proglide and vascade closure devices.     Device: After confirmation of the device position on fluoroscopy and ICE, anchor with a tug test, compression and seal a 35 mm Watchman was successfully deployed without any immediate complications.     No effusion on ICE was present pre and post watchman deployment.     Recommendations:   ASA and Plavix for 6 months followed by ASA for life   CT in 4 months  Access site monitoring.   TTE today  Likely discharge home today once recovery and monitoring period is complete.      Complications:   None     Stents/Implants:   Cardiovascular Implants       Other Cardiac Implant    Device, Closure, 35mm Watchman Flx Laac - [id number] - Implanted        Inventory item: DEVICE, CLOSURE, 35MM WATCHMAN FLX LAAC Model/Cat number: [id number]    : [employer] Lot number: [id number]    Device identifier: [id number]        As of 3/19/2024       Status: Implanted                                  Estimated Blood Loss:   10 mL    Anesthesia: Moderate Sedation Anesthesia Staff: No anesthesia staff entered.    Any Specimen(s) Removed:   Order Name Source Comment Collection Info Order Time   TYPE AND SCREEN Blood, Venous   3/19/2024  11:17 AM     Release result to MyChart   Immediate        ABORH Blood, Venous   3/19/2024 11:17 AM     Release result to MyChart   Immediate              Electronically signed by: Carlton Zavala MD, 3/19/2024 2:14 PM

## 2024-03-19 NOTE — H&P
The patient is a 73-year-old male with hyperlipidemia and paroxysmal atrial fibrillation. The patient has normal LVEF with left ventricular ejection fraction of 55 to 60% based on transthoracic echocardiogram June 2020.  Patient has no history of significant valvular heart disease.     At his functional baseline, the patient is active.  Patient states that he enjoys doing yard work, using tools and heavy equipment.  He sometimes gets on a ladder works out frequently with resistance training and even windsurfs on occasion. He is asymptomatic from his atrial arrhythmia.  He has been reluctant to start anticoagulant therapy for stroke risk reduction despite numerous conversations with Dr. Pena.     Patient has no history of CVA or TIA.  He is on an aspirin alone strategy.     He has presumed coronary artery disease with an elevated coronary calcium score and is on lipid-lowering therapy.     Given the patient's noncompliance with recommendations for anticoagulant therapy,  the patient is referred for consideration of left atrial appendage closure for stroke risk reduction.         ROS:  Constitutional: not feeling tired, not feeling poorly, no fever and no chills.   Eyes: no eyesight problems, no blurred vision, no diplopia, no eye pain, no purulent discharge from the eyes, eyes not red, no dryness of the eyes and no itching of the eyes.   ENT: no nosebleeds, no hearing loss, no tinnitus, no earache, no sore throat, no hoarseness, no swollen glands in the neck and no nasal discharge.   Cardiovascular: no intermittent leg claudication, no chest pain, no tightness or heavy pressure, no shortness of breath, no palpitations, no lower extremity edema, the heart rate was not slow, the heart rate was not fast and as noted in HPI.   Respiratory: no chronic cough, not coughing up sputum,  no wheezing that is consistent with asthma, no asthma, no orthopnea and no postural nocturnal dyspnea.   Gastrointestinal: no change in  bowel habits, no blood in stools, no diarrhea, no constipation, no nausea, no vomiting, no abdominal pain, no signs and symptoms of ulcer disease, no sydney colored stools and no intolerance to fatty foods.   Genitourinary: no hematuria,  no urinary frequency, no dysuria, no incontinence, no burning sensation during urination, no urinary hesitancy, no nocturia, no genital lesion, no testicular pain, urinary stream is not smaller and urinary stream does not start and stop.   Musculoskeletal: no arthralgias, no myalgias, no joint swelling, no joint stiffness, no muscle weakness, no back pain, no limb pain, no limb swelling and no difficulty walking.   Skin: no skin rashes, no change in skin color and pigmentation, no skin lesions and no skin lumps.   Neurological: no seizures, no frequent falls, no headaches, no dizziness, no tingling, no fainting and no limb weakness.   Psychiatric: no depression, not suicidal, no confusion, no memory lapses or loss, no anxiety, no personality change and no emotional problems.   Endocrine: no goiter, no thyroid disorder, no diabetes mellitus, no excessive thirst, no dry skin, no cold intolerance, no heat intolerance, no erectile dysfunction, no increased urinary frequency, no proptosis and no deepening of the voice.   Hematologic/Lymphatic: no bleeding issues.   All other systems have been reviewed and are negative for complaint.      Physical Exam:      Visit Vitals  Smoking Status Never         Constitutional: alert and in no acute distress.   Eyes: no erythema, swelling or discharge from the eye .   Ears, Nose, Mouth, and Throat: external inspection of ears and nose is normal , lips, teeth, and gums are normal with good dentition  and oropharynx normal with no erythema, edema, exudate or lesions .   Neck: neck is supple, symmetric, trachea midline, no masses  and no thyromegaly .   Pulmonary: no increased work of breathing or signs of respiratory distress , lungs clear to  auscultation. , normal percussion of chest  and chest palpation normal .   Cardiovascular: Irregular, irregular.  S1-S2 within normal limits.  No murmur,  no leg edema, non-displaced PMI, no S3 or S4  Abdomen: abdomen non-tender, no masses  and no hepatomegaly .           Skin:  no skin lesions          Neurologic: non-focal neurologic examination.      Psychiatric judgment and insight is normal , oriented to person, place and time , normal mood and affect .         Labs:           Results for orders placed or performed in visit on 01/04/24   ECG 12 lead (Clinic Performed)   Result Value Ref Range     Ventricular Rate 62 BPM     Atrial Rate 53 BPM     QRS Duration 94 ms     QT Interval 454 ms     QTC Calculation(Bazett) 460 ms     R Axis 69 degrees     T Axis 3 degrees     QRS Count 10 beats     Q Onset 222 ms     T Offset 449 ms     QTC Fredericia 459 ms            Medications:          Current Outpatient Medications   Medication Instructions    aspirin 81 mg, oral, Daily    Lumigan 0.01 % ophthalmic solution INSTILL 1 DROP INTO BOTH EYES ONCE PER DAY AT BEDTIME.    rosuvastatin (Crestor) 40 mg tablet TAKE 1 TABLET BY MOUTH EVERY DAY            Assessment:       This is a 73-year-old male with hyperlipidemia and paroxysmal atrial fibrillation.  Despite numerous recommendations, the patient has been unwilling to start anticoagulant therapy for stroke risk reduction.  The patient also has a very active lifestyle.     The CHADS-VASC score is 3 and HAS-BLED score is 1. Given the patient's noncompliance with anticoagulant therapy despite an elevated [id number] score in addition to his very active lifestyle, the patient is a reasonable candidate for consideration of left atrial appendage occluder placement.     Plan for LAAC closure today  Today we discussed the left atrial appendage closure procedure. The patient was given written educational handout materials and watched an educational video. All risks, benefits and  alternative were discussed.      The risks discussed included but were not limited to vascular complications, sedation related complications, risk of MI, CVA, device embolization, pericardial tamponade and death. The patient verbalized understanding and decided to proceed.         Plan will be for preprocedural cardiac CT. Following device implant, strategy will be for dual antiplatelet therapy with aspirin and clopidogrel for 6 months then aspirin for life.

## 2024-03-19 NOTE — PROGRESS NOTES
Pharmacy Medication History Review    Ok Chris is a 74 y.o. male admitted for Atrial fibrillation (CMS/[facility]). Pharmacy reviewed the patient's [id number] medications and allergies for accuracy.        The list below reflects the updated PTA list. Comments regarding how patient may be taking medications differently can be found in the Admit Orders Activity  Prior to Admission Medications   Prescriptions Last Dose Informant Patient Reported?   Lumigan 0.01 % ophthalmic solution 3/18/2024 Spouse/Significant Other Yes   Sig: INSTILL 1 DROP INTO BOTH EYES ONCE PER DAY AT BEDTIME.   aspirin 81 mg EC tablet 3/18/2024 Spouse/Significant Other Yes   Sig: Take 1 tablet (81 mg) by mouth once daily.   rosuvastatin (Crestor) 40 mg tablet 3/18/2024 Spouse/Significant Other No   Sig: TAKE 1 TABLET BY MOUTH EVERY DAY      Facility-Administered Medications: None        The list below reflects the updated allergy list. Please review each documented allergy for additional clarification and justification.  Allergies  Reviewed by Sheyla Roberto RN on 3/19/2024        Severity Reactions Comments    Bee Venom Protein (honey Bee) Not Specified Unknown             Patient declines M2B at discharge.    Sources used to complete the med history include out patient fill history, OARRS, and patient interview along with 3/6/24 office visit with Dr. Brown.       Below are additional concerns with the patient's PTA list.      Stanton Chapman [facility]  Transitions of Care Clinical Pharmacist  Please reach out via Epic Chat for questions, if no response call  [employer] or [facility]

## 2024-03-21 LAB
ACT BLD: 287 SEC (ref 89–169)
ATRIAL RATE: 53 BPM
Q ONSET: 222 MS
QRS COUNT: 8 BEATS
QRS DURATION: 98 MS
QT INTERVAL: 518 MS
QTC CALCULATION(BAZETT): 467 MS
QTC FREDERICIA: 484 MS
R AXIS: 52 DEGREES
T AXIS: 20 DEGREES
T OFFSET: 481 MS
VENTRICULAR RATE: 49 BPM

## 2024-04-03 ENCOUNTER — APPOINTMENT (OUTPATIENT)
Dept: CARDIOLOGY | Facility: HOSPITAL | Age: 74
End: 2024-04-03
Payer: MEDICARE

## 2024-05-01 ENCOUNTER — OFFICE VISIT (OUTPATIENT)
Dept: CARDIOLOGY | Facility: HOSPITAL | Age: 74
End: 2024-05-01
Payer: MEDICARE

## 2024-05-01 VITALS
BODY MASS INDEX: 26.76 KG/M2 | DIASTOLIC BLOOD PRESSURE: 82 MMHG | WEIGHT: 186.51 LBS | SYSTOLIC BLOOD PRESSURE: 134 MMHG | OXYGEN SATURATION: 99 % | HEART RATE: 57 BPM

## 2024-05-01 DIAGNOSIS — E78.5 HYPERLIPIDEMIA, UNSPECIFIED HYPERLIPIDEMIA TYPE: Primary | ICD-10-CM

## 2024-05-01 DIAGNOSIS — I48.21 PERMANENT ATRIAL FIBRILLATION (MULTI): ICD-10-CM

## 2024-05-01 PROCEDURE — 99213 OFFICE O/P EST LOW 20 MIN: CPT | Performed by: NURSE PRACTITIONER

## 2024-05-01 PROCEDURE — 1036F TOBACCO NON-USER: CPT | Performed by: NURSE PRACTITIONER

## 2024-05-01 PROCEDURE — 1159F MED LIST DOCD IN RCRD: CPT | Performed by: NURSE PRACTITIONER

## 2024-05-01 RX ORDER — CLOPIDOGREL BISULFATE 75 MG/1
75 TABLET ORAL DAILY
Qty: 90 TABLET | Refills: 3 | Status: SHIPPED | OUTPATIENT
Start: 2024-05-01 | End: 2025-05-01

## 2024-05-01 ASSESSMENT — ENCOUNTER SYMPTOMS
PSYCHIATRIC NEGATIVE: 1
RESPIRATORY NEGATIVE: 1
HEMATOLOGIC/LYMPHATIC NEGATIVE: 1
MUSCULOSKELETAL NEGATIVE: 1
EYES NEGATIVE: 1
DEPRESSION: 0
ALLERGIC/IMMUNOLOGIC NEGATIVE: 1
NEUROLOGICAL NEGATIVE: 1
GASTROINTESTINAL NEGATIVE: 1
CONSTITUTIONAL NEGATIVE: 1
ENDOCRINE NEGATIVE: 1
CARDIOVASCULAR NEGATIVE: 1

## 2024-05-01 NOTE — PROGRESS NOTES
Chief Complaint:   Post-Watchman     History Of Present Illness:    Ok Chris is a very pleasant 74 year old gentleman with a history of atrial fibrillation and HLD, he is here for a follow up visit. The patient is seen in collaboration with Dr. Pena. Patient had a Watchman device. States he has been feeling. Denies chest pain, shortness of breath or heart palpitations. Continues to stay active.         Last Recorded Vitals:  Vitals:    05/01/24 0911   BP: 134/82   Pulse: 57   SpO2: 99%   Weight: 84.6 kg (186 lb 8.2 oz)       Past Medical History:  He has a past medical history of Abnormal ECG, Atrial fibrillation (Multi), Encounter for removal of sutures (07/01/2020), Hyperlipidemia, unspecified (03/12/2019), Laceration without foreign body of unspecified ear, initial encounter (07/01/2020), OA (osteoarthritis), Personal history of other diseases of the circulatory system (06/23/2016), Personal history of other specified conditions (07/30/2020), Personal history of traumatic brain injury (06/24/2016), and Thoracic aortic aneurysm (CMS-HCC).    Past Surgical History:  He has a past surgical history that includes Tonsillectomy (06/24/2016); Vasectomy (06/24/2016); Hernia repair (06/23/2016); Other surgical history (06/23/2016); Total hip arthroplasty (04/2023); Eye surgery; and Cardiac catheterization (N/A, 3/19/2024).      Social History:  He reports that he has never smoked. He has never used smokeless tobacco. He reports that he does not currently use alcohol. He reports current drug use. Drug: Marijuana.    Family History:  Family History   Problem Relation Name Age of Onset    Other (CARDIAC DISORDER) Mother Jaclyn     Heart failure Mother Jaclyn     Cancer Brother Hai     Melanoma Brother Hai     Heart attack Brother Hai     Heart disease Father Paulo         Allergies:  Bee venom protein (honey bee)    Outpatient Medications:  Current Outpatient Medications   Medication Instructions     aspirin 81 mg, oral, Daily    clopidogrel (PLAVIX) 75 mg, oral, Daily    Lumigan 0.01 % ophthalmic solution INSTILL 1 DROP INTO BOTH EYES ONCE PER DAY AT BEDTIME.    rosuvastatin (Crestor) 40 mg tablet TAKE 1 TABLET BY MOUTH EVERY DAY     Review of Systems   Constitutional: Negative.    HENT: Negative.     Eyes: Negative.    Respiratory: Negative.     Cardiovascular: Negative.    Gastrointestinal: Negative.    Endocrine: Negative.    Genitourinary: Negative.    Musculoskeletal: Negative.    Skin: Negative.    Allergic/Immunologic: Negative.    Neurological: Negative.    Hematological: Negative.    Psychiatric/Behavioral: Negative.        Physical Exam:  Constitutional:       Appearance: Healthy appearance. Not in distress.   Neck:      Vascular: No JVR. JVD normal.   Pulmonary:      Effort: Pulmonary effort is normal.      Breath sounds: Normal breath sounds. No wheezing. No rhonchi. No rales.   Chest:      Chest wall: Not tender to palpatation.   Cardiovascular:      Normal rate. Irregularly irregular rhythm.      Murmurs: There is no murmur.   Edema:     Peripheral edema absent.   Abdominal:      General: Bowel sounds are normal.      Palpations: Abdomen is soft.      Tenderness: There is no abdominal tenderness.   Musculoskeletal: Normal range of motion. Skin:     General: Skin is warm and dry.   Neurological:      General: No focal deficit present.      Mental Status: Alert and oriented to person, place and time.           Last Labs:  CBC -  Lab Results   Component Value Date    WBC 5.7 03/07/2024    HGB 14.3 03/07/2024    HCT 46.0 03/07/2024    MCV 92 03/07/2024     03/07/2024       CMP -  Lab Results   Component Value Date    CALCIUM 9.3 03/07/2024    PROT 6.6 10/31/2023    ALBUMIN 4.2 10/31/2023    AST 17 10/31/2023    ALT 12 10/31/2023    ALKPHOS 92 10/31/2023    BILITOT 0.6 10/31/2023       LIPID PANEL -   Lab Results   Component Value Date    CHOL 133 10/31/2023    TRIG 61 10/31/2023    HDL 42.1  "10/31/2023    CHHDL 3.2 10/31/2023    LDLF 82 04/27/2022    VLDL 12 10/31/2023    NHDL 91 10/31/2023       RENAL FUNCTION PANEL -   Lab Results   Component Value Date    GLUCOSE 94 03/07/2024     03/07/2024    K 5.0 03/07/2024     03/07/2024    CO2 29 03/07/2024    ANIONGAP 12 03/07/2024    BUN 13 03/07/2024    CREATININE 0.97 03/07/2024    GFRMALE 75 04/18/2023    CALCIUM 9.3 03/07/2024    ALBUMIN 4.2 10/31/2023        No results found for: \"BNP\", \"HGBA1C\"    Last Cardiology Tests:       Echo:  Echocardiogram 3/19/2024   1. Left ventricular systolic function is normal.   2. The left atrium is severely dilated.   3. The right atrium is moderately dilated.   4. There is a trivial to small pericardial effusion posterior to the left ventricle.   5. The patient is in atrial fibrillation which may influence the estimate of left ventricular function and transvalvular flows.   6. Difficult to rule out if it new due to limited imaging prior to the procedure. It is a very tiny effusion. May consider a repeat echo prior to discharge.      6/2/2020   1. The left ventricular systolic function is normal with a 55-60% estimated ejection fraction.   2. Spectral Doppler shows an abnormal pattern of left ventricular diastolic filling.   3. The left atrium is severely dilated.   4. The right atrium is severely dilated.   5. Mild to moderate mitral valve regurgitation.   6. Mild aortic valve stenosis.   7. There is mild aortic valve regurgitation.   8. The patient is in atrial fibrillation which may influence the estimate of left ventricular function and transvalvular flows.     Stress Test:  7/29/2020  IMPRESSION:  1. Normal myocardial perfusion study without evidence of ischemia or  prior infarction.  2. The left ventricle is normal in size.  3. Normal LV wall motion with an LV EF estimated at greater than 50%.    CAC score 11/2018:  LM: 0.  LAD: 1005.6.  LCx: 0.  RCA: 0.     Total: 1005.6.       Assessment/Plan   Very " pleasant 75 yo male from home with h/o permanent a.fib, dyslipidemia, CAC score 1008 (2019). He had a Watchman device placed 3/2024. Continues to do well, has been active without limitations. Heart rate and blood pressure are well controlled today.     Plan   -call with any questions   -CT scan in July   -continue Aspirin and Plavix   -continue  Crestor   -follow up in January     Sanjuanita Cosme, APRN-CNP

## 2024-05-06 NOTE — OP NOTE
PREOPERATIVE DIAGNOSIS:  Left hip end-stage bone-on-bone osteoarthritis.    POSTOPERATIVE DIAGNOSIS:  Left hip end-stage bone-on-bone osteoarthritis.    OPERATION/PROCEDURE:  Left direct anterior approach total hip arthroplasty.    SURGEON:  Bebeto Buck DO.    ASSISTANT(S):  1. First assistant is Ryann Levin DO, orthopedic resident.  2. Second assistant is Aba Palma, physician assistant.    ANESTHESIA:    IMPLANTS UTILIZED:  1. Pato Insignia 132-degree femoral stem-size 7, high offset.  2. Jeffers Trident II PSL acetabular cup-size 56, code F.  3. Pato X3 0-degree non-lipped polyethylene liner - size 36, code  F.   4. Biolox Delta ceramic femoral head-size 36+ 2.5 mm length.    INTRAOPERATIVE PATHOLOGY AND FINDINGS/OPERATIVE INDICATIONS:  The patient is a pleasant 73-year-old male, longstanding history of  worsening pain about the level of his left hip that progressed to the  point that it greatly interfered with his quality of life.  Physical  exam revealed an antalgic gait, short stance phase on the left with  associated circumduction gait.  Negative Trendelenburg.  Range of  motion of the left hip fairly ankylosed.  Preoperative radiographs  reveal a severe bone-on-bone apposition of the left femoral head  within the acetabulum, associated subchondral cystic changes and  subchondral sclerosis with marginal osteophytic formation around the  acetabular rim and femoral head neck junction.  With continued pain  and disability related to his left hip having failed multiple  attempts at conservative management over a number of years.  Ultimately, the patient did choose to move forward with total hip  arthroplasty.  At the time of the procedure, the patient was found to  have a hypertrophic hip capsule consistent with ankylosed nature of  his hip, arthritic related joint effusion was noted.  Macerated  calcified labral tearing noted circumferentially.  Complete articular  cartilage loss  noted superior medial and about the femoral head and  acetabulum.  Large osseous loose bodies appreciated.  Hypertrophic  osteophytic formation around the femoral head-neck junction.  Hypertrophic synovitic changes noted throughout the joint.  Bone  quality moderately osteopenic/osteoporotic.  There was no indication  of infection at the time of the procedure.     PROCEDURE IN DETAIL:  The patient was correctly identified and marked in preoperative  holding.  Risks, benefits, alternatives, reasonable expectations for  outcomes had previously been discussed.  The patient was then  escorted to operative suite #2 at [facility].  Once in the  operative suite, surgical pause/time-out was performed.  Preoperative  antibiotics were administered.  Intravenous tranexamic acid was  administered and spinal anesthetic followed by MAC sedation was then  provided by the Department of Anesthesia.  The patient was positioned  supine on a standard operative table.  Bony prominences well padded.  Hips positioned over the break of the bed.  Bilateral lower  extremities sterilely prepped and draped in standard fashion from  umbilicus to ankles.  Surgical window cut through the drapes about  the level of the left hip that they were then covered with an Ioban.  Anatomic landmarks were identified and marked with sterile marking  pen.  At this time, a 10 blade was used to make a 10 cm incision  beginning 3 cm lateral to the anterior superior iliac spine, carried  distally and obliquely aiming towards the lateral femoral epicondyle  of the knee.  Careful dissection through subcutaneous tissues  utilizing Bovie cautery to achieve hemostasis was performed until the  investing fascia of tensor fascia hoa was identified.  It was then  incised in line with the skin incision.  Muscle belly of tensor  fascia hoa was then retracted laterally.  A superior extracapsular  retractor was then inserted.  Circumflex vessels were identified  and  coagulated with Aquamantys and sharply incised with Bovie cautery.  The inferior extracapsular retractor was then inserted.  Pericapsular  fat was then sharply excised with Bovie cautery.  Bump placed  underneath the left knee.  Child elevator used to elevate the rectus  femoris off the anterior capsule.  Anterior acetabular retractor was  then inserted.  Hemostasis along the intertrochanteric line and the  anterior hip capsule was then performed with Aquamantys.  Bovie  cautery was then used to perform a standard 0 arthrotomy of the  anterior hip capsule, tagging the superior and inferior leaflets. At  this time, extracapsular retractors removed intracapsularly.  Anatomic landmarks were identified and marked about the anterior  femoral neck.  A freehand cut of the femoral neck was performed  resecting a wafer of bone.  Native femoral head then removed with a  corkscrew power.  The acetabular retractors were then inserted.  Labrum sharply excised circumferentially with Bovie cautery.  Synovitis sharply excised with Bovie cautery.  Hypertrophic posterior  capsular tissue was then thinned with Bovie cautery.  Aquamantys was  used to achieve hemostasis about the posterior hip capsule as well as  a pulvinar.  Pulvinar was then sharply excised with Bovie cautery.  The native femoral head measured 54 mm in size.  As such, we began  reaming with a size 54 reamer going up by 2 mm increments up to a  size 56 reamer utilizing fluoroscopic imaging to ensure accuracy of  depth and positioning of ream.  The wound was copiously irrigated  with sterile saline via Simpulse lavage and a size 56 cup was then  malleted into position again utilizing fluoroscopic imaging to ensure  accuracy of fit, fill and positioning of the cup.  The cup was found  to have excellent fixation.  At this time, the polyethylene liner was  malleted into the locking mechanism and found to have excellent  fixation.  We then turned our attention to the  femur.  The femur was  positioned for preparation.  Superior and inferior capsular releases  were completed.  Femoral elevation was achieved and adequate femoral  exposure was achieved.  Box chisel was passed followed by curved  curette and a suction tip to function as a canal finder followed by  lateralizing broach followed by sequential broaching beginning with a  size 0, going up to a size 7.  The calcar planer was utilized, 132  degree trial, high offset neck was attached with a 36+ 2.5 mm head.  Open reduction of the hip was performed.  Leg lengths were assessed  and found to be equal to the level of the medial malleoli.  The  patella with equal femoral length on intraoperative Galeazzi exam.  Stability of the hip was assessed with external rotation,  hyperextension, external rotation, hyperflexion, hyperflexion with  adduction, hyperflexion with abduction, and internal rotation,  position of sleep as well as figure-of-4 position.  The hip was found  to be inherently quite stable.  Fluoroscopic imaging was then  utilized on both AP and frog-leg lateral views demonstrating adequate  fit, fill, and position of the trial femoral components within the  acetabular component.  At this time, a bone hook manual traction  external rotation was used to dislocate the trial components.  Repeat  femoral exposure was achieved.  Trial components removed.  Final  femoral components were impacted into place.  Final femoral head was  impacted onto the Michel taper of the stem and final open reduction of  the hip was performed.  Leg lengths and stability were again assessed  and found to be equal to that of the trial components.  Final  fluoroscopic imaging was then obtained on both AP and frog-leg  lateral views demonstrating well-positioned total hip arthroplasty  without obvious associated complications.  At this time, the wound  was copiously irrigated with dilute Betadine allowed to soak x3  minutes followed by copious  irrigation with sterile saline via  Simpulse lavage.  The Nato powder was placed deep into the hip  capsule.  Tag sutures removed from the anterior hip capsule.  Anterior hip capsule reapproximated with a #1 Vicryl ligature  interrupted simple stitch fashion.  The remainder of the Nato  powder was then placed into the deep fascial plane.  Pericapsular  pain injection was then provided in the form of ropivacaine, Toradol,  Duramorph, and saline with epinephrine.  The investing fascia of  tensor fascia hoa was then reapproximated with a #1 Vicryl ligature  in a running locked segmental fashion.  Deep subcutaneous tissues  were closed with 2-0 Vicryl ligature in buried interrupted fashion.  Skin reapproximated with 3-0 V-Loc in a running subcuticular fashion  followed by application of a Prineo dressing.  Once the Prineo  dressing was completely dried, self-adherent Mepilex silver dressing  was applied over top of the wound followed by application of  bilateral thigh-high ANI hose.  The patient was then woken and  transferred to hospital bed, returned to PACU in stable condition.  Counts were correct.  Case was clean and elective.     COMPLICATIONS:  None.    SPECIMENS:  None.    ESTIMATED BLOOD LOSS:  250 mL.    Bebeto Cochran DO    DD:  04/17/2023 10:50:39 EST  DT:  04/17/2023 13:17:18 EST  DICTATION NUMBER:  [id number]  INTERNAL JOB NUMBER:  [id number]    CC:  Bebeto Cochran DO, Fax: [phone]        Electronic Signatures:  BEBETO COCHRAN () (Signed on 17-Apr-2023 16:58)   Authored  Unsigned, Draft (SYS GENERATED) (Entered on 17-Apr-2023 13:17)   Entered    Last Updated: 17-Apr-2023 16:58 by BEBETO COCHRAN ()

## 2024-07-02 ENCOUNTER — LAB (OUTPATIENT)
Dept: LAB | Facility: LAB | Age: 74
End: 2024-07-02
Payer: MEDICARE

## 2024-07-02 DIAGNOSIS — I48.91 ATRIAL FIBRILLATION, UNSPECIFIED TYPE (MULTI): ICD-10-CM

## 2024-07-02 LAB
ALBUMIN SERPL BCP-MCNC: 4.4 G/DL (ref 3.4–5)
ANION GAP SERPL CALC-SCNC: 13 MMOL/L (ref 10–20)
BUN SERPL-MCNC: 12 MG/DL (ref 6–23)
CALCIUM SERPL-MCNC: 9 MG/DL (ref 8.6–10.3)
CHLORIDE SERPL-SCNC: 105 MMOL/L (ref 98–107)
CO2 SERPL-SCNC: 28 MMOL/L (ref 21–32)
CREAT SERPL-MCNC: 1.07 MG/DL (ref 0.5–1.3)
EGFRCR SERPLBLD CKD-EPI 2021: 72 ML/MIN/1.73M*2
GLUCOSE SERPL-MCNC: 92 MG/DL (ref 74–99)
PHOSPHATE SERPL-MCNC: 3.5 MG/DL (ref 2.5–4.9)
POTASSIUM SERPL-SCNC: 4.1 MMOL/L (ref 3.5–5.3)
SODIUM SERPL-SCNC: 142 MMOL/L (ref 136–145)

## 2024-07-02 PROCEDURE — 80069 RENAL FUNCTION PANEL: CPT

## 2024-07-02 PROCEDURE — 36415 COLL VENOUS BLD VENIPUNCTURE: CPT

## 2024-07-19 ENCOUNTER — HOSPITAL ENCOUNTER (OUTPATIENT)
Dept: RADIOLOGY | Facility: HOSPITAL | Age: 74
Discharge: HOME | End: 2024-07-19
Payer: MEDICARE

## 2024-07-19 DIAGNOSIS — I48.91 ATRIAL FIBRILLATION, UNSPECIFIED TYPE (MULTI): ICD-10-CM

## 2024-07-19 PROCEDURE — 2550000001 HC RX 255 CONTRASTS: Performed by: INTERNAL MEDICINE

## 2024-07-19 PROCEDURE — 75572 CT HRT W/3D IMAGE: CPT

## 2024-10-10 DIAGNOSIS — E78.5 HYPERLIPIDEMIA, UNSPECIFIED: ICD-10-CM

## 2024-10-13 RX ORDER — ROSUVASTATIN CALCIUM 40 MG/1
TABLET, COATED ORAL
Qty: 90 TABLET | Refills: 0 | Status: SHIPPED | OUTPATIENT
Start: 2024-10-13

## 2025-01-09 ENCOUNTER — OFFICE VISIT (OUTPATIENT)
Dept: CARDIOLOGY | Facility: HOSPITAL | Age: 75
End: 2025-01-09
Payer: MEDICARE

## 2025-01-09 VITALS
OXYGEN SATURATION: 99 % | WEIGHT: 186.95 LBS | BODY MASS INDEX: 26.82 KG/M2 | SYSTOLIC BLOOD PRESSURE: 118 MMHG | DIASTOLIC BLOOD PRESSURE: 62 MMHG | HEART RATE: 52 BPM

## 2025-01-09 DIAGNOSIS — R93.1 ABNORMAL HEART SCORE CT: Primary | ICD-10-CM

## 2025-01-09 DIAGNOSIS — I48.91 ATRIAL FIBRILLATION, UNSPECIFIED TYPE (MULTI): ICD-10-CM

## 2025-01-09 LAB
ATRIAL RATE: 58 BPM
Q ONSET: 222 MS
QRS COUNT: 9 BEATS
QRS DURATION: 90 MS
QT INTERVAL: 486 MS
QTC CALCULATION(BAZETT): 456 MS
QTC FREDERICIA: 466 MS
R AXIS: 66 DEGREES
T AXIS: 34 DEGREES
T OFFSET: 465 MS
VENTRICULAR RATE: 53 BPM

## 2025-01-09 PROCEDURE — G2211 COMPLEX E/M VISIT ADD ON: HCPCS | Performed by: INTERNAL MEDICINE

## 2025-01-09 PROCEDURE — 1159F MED LIST DOCD IN RCRD: CPT | Performed by: INTERNAL MEDICINE

## 2025-01-09 PROCEDURE — 99213 OFFICE O/P EST LOW 20 MIN: CPT | Performed by: INTERNAL MEDICINE

## 2025-01-09 PROCEDURE — 1036F TOBACCO NON-USER: CPT | Performed by: INTERNAL MEDICINE

## 2025-01-09 PROCEDURE — 93005 ELECTROCARDIOGRAM TRACING: CPT | Performed by: INTERNAL MEDICINE

## 2025-01-09 NOTE — PROGRESS NOTES
Chief Complaint:   Follow-up     History Of Present Illness:    Ok Chris is a 74 y.o. male presenting for follow-up.  Doing well from a cardiac standpoint.  Denies any progressive angina or dyspnea.  Has noted at night sometimes has to take a gasp for air while in bed, does not necessarily bother him.  Also notes possible mild inguinal hernia.  He has stopped taking his Plavix after watchman.    Last Recorded Vitals:  Vitals:    01/09/25 0804   BP: 118/62   Pulse: 52   SpO2: 99%   Weight: 84.8 kg (186 lb 15.2 oz)       Past Medical History:  He has a past medical history of Abnormal ECG, Atrial fibrillation (Multi), Encounter for removal of sutures (07/01/2020), Hyperlipidemia, unspecified (03/12/2019), Laceration without foreign body of unspecified ear, initial encounter (07/01/2020), OA (osteoarthritis), Personal history of other diseases of the circulatory system (06/23/2016), Personal history of other specified conditions (07/30/2020), Personal history of traumatic brain injury (06/24/2016), and Thoracic aortic aneurysm (CMS-HCC).    Past Surgical History:  He has a past surgical history that includes Tonsillectomy (06/24/2016); Vasectomy (06/24/2016); Hernia repair (06/23/2016); Other surgical history (06/23/2016); Total hip arthroplasty (04/2023); Eye surgery; and Cardiac catheterization (N/A, 3/19/2024).      Social History:  He reports that he has never smoked. He has never used smokeless tobacco. He reports that he does not currently use alcohol. He reports current drug use. Drug: Marijuana.    Family History:  Family History   Problem Relation Name Age of Onset    Other (CARDIAC DISORDER) Mother Jaclyn     Heart failure Mother Jaclyn     Cancer Brother Hai     Melanoma Brother Hai     Heart attack Brother Hai     Heart disease Father Paulo         Allergies:  Bee venom protein (honey bee)    Outpatient Medications:  Current Outpatient Medications   Medication Instructions    aspirin 81 mg,  Daily    clopidogrel (PLAVIX) 75 mg, oral, Daily    Lumigan 0.01 % ophthalmic solution INSTILL 1 DROP INTO BOTH EYES ONCE PER DAY AT BEDTIME.    rosuvastatin (Crestor) 40 mg tablet TAKE 1 TABLET BY MOUTH EVERY DAY       Physical Exam:  Constitutional:       Appearance: Healthy appearance. Not in distress.   Neck:      Vascular: No JVR. JVD normal.   Pulmonary:      Effort: Pulmonary effort is normal.      Breath sounds: Normal breath sounds. No wheezing. No rhonchi. No rales.   Chest:      Chest wall: Not tender to palpatation.   Cardiovascular:      Normal rate. Regular rhythm.      Murmurs: There is no murmur.   Edema:     Peripheral edema absent.   Abdominal:      General: Bowel sounds are normal.      Palpations: Abdomen is soft.      Tenderness: There is no abdominal tenderness.   Musculoskeletal: Normal range of motion. Skin:     General: Skin is warm and dry.   Neurological:      General: No focal deficit present.      Mental Status: Alert and oriented to person, place and time.           Last Labs:  CBC -  Lab Results   Component Value Date    WBC 5.7 03/07/2024    HGB 14.3 03/07/2024    HCT 46.0 03/07/2024    MCV 92 03/07/2024     03/07/2024       CMP -  Lab Results   Component Value Date    CALCIUM 9.0 07/02/2024    PHOS 3.5 07/02/2024    PROT 6.6 10/31/2023    ALBUMIN 4.4 07/02/2024    AST 17 10/31/2023    ALT 12 10/31/2023    ALKPHOS 92 10/31/2023    BILITOT 0.6 10/31/2023       LIPID PANEL -   Lab Results   Component Value Date    CHOL 133 10/31/2023    TRIG 61 10/31/2023    HDL 42.1 10/31/2023    CHHDL 3.2 10/31/2023    LDLF 82 04/27/2022    VLDL 12 10/31/2023    NHDL 91 10/31/2023       RENAL FUNCTION PANEL -   Lab Results   Component Value Date    GLUCOSE 92 07/02/2024     07/02/2024    K 4.1 07/02/2024     07/02/2024    CO2 28 07/02/2024    ANIONGAP 13 07/02/2024    BUN 12 07/02/2024    CREATININE 1.07 07/02/2024    GFRMALE 75 04/18/2023    CALCIUM 9.0 07/02/2024    PHOS 3.5  "07/02/2024    ALBUMIN 4.4 07/02/2024        No results found for: \"BNP\", \"HGBA1C\"    Last Cardiology Tests:  Echocardiogram 3/19/2024   1. Left ventricular systolic function is normal.   2. The left atrium is severely dilated.   3. The right atrium is moderately dilated.   4. There is a trivial to small pericardial effusion posterior to the left ventricle.   5. The patient is in atrial fibrillation which may influence the estimate of left ventricular function and transvalvular flows.   6. Difficult to rule out if it new due to limited imaging prior to the procedure. It is a very tiny effusion. May consider a repeat echo prior to discharge.        6/2/2020   1. The left ventricular systolic function is normal with a 55-60% estimated ejection fraction.   2. Spectral Doppler shows an abnormal pattern of left ventricular diastolic filling.   3. The left atrium is severely dilated.   4. The right atrium is severely dilated.   5. Mild to moderate mitral valve regurgitation.   6. Mild aortic valve stenosis.   7. There is mild aortic valve regurgitation.   8. The patient is in atrial fibrillation which may influence the estimate of left ventricular function and transvalvular flows.     Stress Test:  7/29/2020  IMPRESSION:  1. Normal myocardial perfusion study without evidence of ischemia or  prior infarction.  2. The left ventricle is normal in size.  3. Normal LV wall motion with an LV EF estimated at greater than 50%.     CAC score 11/2018:  LM: 0.  LAD: 1005.6.  LCx: 0.  RCA: 0.     Total: 1005.6.    Assessment/Plan   Very pleasant 73 yo male from home with h/o permanent a.fib s/p Watchman 3/2024, dyslipidemia, CAC score 1008 (2019).   Overall doing very well clinically, blood pressure well-controlled.  Discussed if wanted referral for possible hernia--will defer for now (knows he can call).  Plan to continue current aspirin and rosuvastatin.  Will see him again in 1 year or sooner if needed.        Nathen Pena, " MD

## 2025-01-10 DIAGNOSIS — E78.5 HYPERLIPIDEMIA, UNSPECIFIED: ICD-10-CM

## 2025-01-10 RX ORDER — ROSUVASTATIN CALCIUM 40 MG/1
TABLET, COATED ORAL
Qty: 90 TABLET | Refills: 0 | OUTPATIENT
Start: 2025-01-10

## 2025-01-14 ENCOUNTER — TELEPHONE (OUTPATIENT)
Dept: PRIMARY CARE | Facility: CLINIC | Age: 75
End: 2025-01-14
Payer: MEDICARE

## 2025-01-14 DIAGNOSIS — E78.5 HYPERLIPIDEMIA, UNSPECIFIED: ICD-10-CM

## 2025-01-14 RX ORDER — ROSUVASTATIN CALCIUM 40 MG/1
40 TABLET, COATED ORAL DAILY
Qty: 90 TABLET | Refills: 2 | Status: SHIPPED | OUTPATIENT
Start: 2025-01-14

## 2025-01-14 NOTE — TELEPHONE ENCOUNTER
Medication Refill Request:       Medication: rosuvastatin (Crestor) 40 mg tablet  1 po every day     Qty: 90       LOV: 10/30/2023- [place]    NOV: 5/08/2025- [place]       Pharmacy: [facility]

## 2025-05-07 PROBLEM — M19.90 ARTHRITIS: Status: ACTIVE | Noted: 2025-05-07

## 2025-05-07 PROBLEM — H93.8X1 FULLNESS IN EAR, RIGHT: Status: RESOLVED | Noted: 2023-10-19 | Resolved: 2025-05-07

## 2025-05-07 PROBLEM — J30.2 SEASONAL ALLERGIES: Status: ACTIVE | Noted: 2025-05-07

## 2025-05-07 PROBLEM — R55 SYNCOPE: Status: ACTIVE | Noted: 2025-05-07
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
## 2025-05-07 PROBLEM — H26.9 CATARACTS, BILATERAL: Status: RESOLVED | Noted: 2023-10-19 | Resolved: 2025-05-07

## 2025-05-07 PROBLEM — I48.92 ATRIAL FLUTTER (MULTI): Status: ACTIVE | Noted: 2025-05-07

## 2025-05-07 PROBLEM — M71.9 BURSITIS: Status: ACTIVE | Noted: 2025-05-07

## 2025-05-07 PROBLEM — F10.10 ALCOHOL ABUSE: Status: RESOLVED | Noted: 2025-05-07 | Resolved: 2025-05-07

## 2025-05-07 PROBLEM — R35.1 NOCTURIA: Status: RESOLVED | Noted: 2023-10-19 | Resolved: 2025-05-07

## 2025-05-07 RX ORDER — AMOXICILLIN 500 MG/1
CAPSULE ORAL
COMMUNITY
Start: 2024-10-17

## 2025-05-08 ENCOUNTER — APPOINTMENT (OUTPATIENT)
Dept: PRIMARY CARE | Facility: CLINIC | Age: 75
End: 2025-05-08
Payer: MEDICARE

## 2025-05-08 VITALS
WEIGHT: 184.2 LBS | BODY MASS INDEX: 26.37 KG/M2 | DIASTOLIC BLOOD PRESSURE: 72 MMHG | HEIGHT: 70 IN | HEART RATE: 62 BPM | SYSTOLIC BLOOD PRESSURE: 118 MMHG | OXYGEN SATURATION: 99 %

## 2025-05-08 DIAGNOSIS — D64.9 ANEMIA, UNSPECIFIED TYPE: ICD-10-CM

## 2025-05-08 DIAGNOSIS — K40.90 RIGHT INGUINAL HERNIA: ICD-10-CM

## 2025-05-08 DIAGNOSIS — R73.09 BLOOD GLUCOSE ABNORMAL: ICD-10-CM

## 2025-05-08 DIAGNOSIS — Z13.220 SCREENING FOR CHOLESTEROL LEVEL: ICD-10-CM

## 2025-05-08 DIAGNOSIS — Z23 NEED FOR VACCINATION: ICD-10-CM

## 2025-05-08 DIAGNOSIS — Z13.220 ENCOUNTER FOR LIPID SCREENING FOR CARDIOVASCULAR DISEASE: ICD-10-CM

## 2025-05-08 DIAGNOSIS — I48.21 PERMANENT ATRIAL FIBRILLATION (MULTI): ICD-10-CM

## 2025-05-08 DIAGNOSIS — Z00.00 ROUTINE GENERAL MEDICAL EXAMINATION AT HEALTH CARE FACILITY: Primary | ICD-10-CM

## 2025-05-08 DIAGNOSIS — Z79.899 ON LONG TERM DRUG THERAPY: ICD-10-CM

## 2025-05-08 DIAGNOSIS — E78.5 DYSLIPIDEMIA, GOAL LDL BELOW 70: ICD-10-CM

## 2025-05-08 DIAGNOSIS — Z00.00 ROUTINE ADULT HEALTH MAINTENANCE: ICD-10-CM

## 2025-05-08 DIAGNOSIS — E55.9 VITAMIN D DEFICIENCY: ICD-10-CM

## 2025-05-08 DIAGNOSIS — Z12.5 SCREENING FOR PROSTATE CANCER: ICD-10-CM

## 2025-05-08 DIAGNOSIS — Z13.6 ENCOUNTER FOR LIPID SCREENING FOR CARDIOVASCULAR DISEASE: ICD-10-CM

## 2025-05-08 DIAGNOSIS — Z13.21 ENCOUNTER FOR VITAMIN DEFICIENCY SCREENING: ICD-10-CM

## 2025-05-08 DIAGNOSIS — Z13.1 SCREENING FOR DIABETES MELLITUS: ICD-10-CM

## 2025-05-08 DIAGNOSIS — Z13.29 SCREENING FOR THYROID DISORDER: ICD-10-CM

## 2025-05-08 PROBLEM — B35.3 TINEA PEDIS OF BOTH FEET: Status: RESOLVED | Noted: 2023-10-19 | Resolved: 2025-05-08

## 2025-05-08 PROBLEM — I48.92 ATRIAL FLUTTER (MULTI): Status: RESOLVED | Noted: 2025-05-07 | Resolved: 2025-05-08

## 2025-05-08 PROBLEM — H90.3 BILATERAL SENSORINEURAL HEARING LOSS: Status: RESOLVED | Noted: 2023-10-19 | Resolved: 2025-05-08

## 2025-05-08 PROBLEM — R55 SYNCOPE: Status: RESOLVED | Noted: 2025-05-07 | Resolved: 2025-05-08

## 2025-05-08 PROBLEM — E04.1 THYROID NODULE: Status: RESOLVED | Noted: 2023-10-19 | Resolved: 2025-05-08

## 2025-05-08 PROBLEM — J30.2 SEASONAL ALLERGIES: Status: RESOLVED | Noted: 2025-05-07 | Resolved: 2025-05-08

## 2025-05-08 PROBLEM — H69.91 DYSFUNCTION OF RIGHT EUSTACHIAN TUBE: Status: RESOLVED | Noted: 2023-10-19 | Resolved: 2025-05-08

## 2025-05-08 PROBLEM — M71.9 BURSITIS: Status: RESOLVED | Noted: 2025-05-07 | Resolved: 2025-05-08

## 2025-05-08 PROBLEM — I48.91 ATRIAL FIBRILLATION, UNSPECIFIED TYPE (MULTI): Status: RESOLVED | Noted: 2024-03-18 | Resolved: 2025-05-08

## 2025-05-08 PROBLEM — M16.11 OSTEOARTHRITIS OF RIGHT HIP, UNSPECIFIED OSTEOARTHRITIS TYPE: Status: RESOLVED | Noted: 2023-11-07 | Resolved: 2025-05-08

## 2025-05-08 PROBLEM — R79.89 ELEVATED LFTS: Status: RESOLVED | Noted: 2023-10-19 | Resolved: 2025-05-08

## 2025-05-08 PROBLEM — M19.90 ARTHRITIS: Status: RESOLVED | Noted: 2025-05-07 | Resolved: 2025-05-08

## 2025-05-08 PROCEDURE — 1159F MED LIST DOCD IN RCRD: CPT | Performed by: NURSE PRACTITIONER

## 2025-05-08 PROCEDURE — 1158F ADVNC CARE PLAN TLK DOCD: CPT | Performed by: NURSE PRACTITIONER

## 2025-05-08 PROCEDURE — 1160F RVW MEDS BY RX/DR IN RCRD: CPT | Performed by: NURSE PRACTITIONER

## 2025-05-08 PROCEDURE — 1036F TOBACCO NON-USER: CPT | Performed by: NURSE PRACTITIONER

## 2025-05-08 PROCEDURE — G0439 PPPS, SUBSEQ VISIT: HCPCS | Performed by: NURSE PRACTITIONER

## 2025-05-08 PROCEDURE — G0009 ADMIN PNEUMOCOCCAL VACCINE: HCPCS | Performed by: NURSE PRACTITIONER

## 2025-05-08 PROCEDURE — 1170F FXNL STATUS ASSESSED: CPT | Performed by: NURSE PRACTITIONER

## 2025-05-08 PROCEDURE — 1123F ACP DISCUSS/DSCN MKR DOCD: CPT | Performed by: NURSE PRACTITIONER

## 2025-05-08 PROCEDURE — 99214 OFFICE O/P EST MOD 30 MIN: CPT | Performed by: NURSE PRACTITIONER

## 2025-05-08 PROCEDURE — 90677 PCV20 VACCINE IM: CPT | Performed by: NURSE PRACTITIONER

## 2025-05-08 ASSESSMENT — ENCOUNTER SYMPTOMS
LOSS OF SENSATION IN FEET: 0
EYE PAIN: 0
COLOR CHANGE: 0
ABDOMINAL PAIN: 0
WOUND: 0
CONSTIPATION: 0
ADENOPATHY: 0
SHORTNESS OF BREATH: 0
PALPITATIONS: 0
JOINT SWELLING: 0
DEPRESSION: 0
HEADACHES: 0
BACK PAIN: 0
COUGH: 0
NAUSEA: 0
FATIGUE: 0
TROUBLE SWALLOWING: 0
WEAKNESS: 0
BRUISES/BLEEDS EASILY: 0
DIFFICULTY URINATING: 0
DIARRHEA: 0
DIZZINESS: 0
ABDOMINAL DISTENTION: 0
MYALGIAS: 0
CHILLS: 0
FEVER: 0
OCCASIONAL FEELINGS OF UNSTEADINESS: 0
WHEEZING: 0
SEIZURES: 0

## 2025-05-08 ASSESSMENT — PATIENT HEALTH QUESTIONNAIRE - PHQ9
2. FEELING DOWN, DEPRESSED OR HOPELESS: NOT AT ALL
1. LITTLE INTEREST OR PLEASURE IN DOING THINGS: NOT AT ALL
SUM OF ALL RESPONSES TO PHQ9 QUESTIONS 1 AND 2: 0

## 2025-05-08 ASSESSMENT — ACTIVITIES OF DAILY LIVING (ADL)
GROCERY_SHOPPING: INDEPENDENT
MANAGING_FINANCES: INDEPENDENT
TAKING_MEDICATION: INDEPENDENT
BATHING: INDEPENDENT
DRESSING: INDEPENDENT
DOING_HOUSEWORK: INDEPENDENT

## 2025-05-08 NOTE — ASSESSMENT & PLAN NOTE
Routine blood work ordered today for for assessment purposes.  Will continue to monitor as appropriate  -Most recent colonoscopy completed in 2023 with recommendations for repeat screening in 10 years (2033)

## 2025-05-08 NOTE — PATIENT INSTRUCTIONS
Please trial over-the-counter nasal sprays such as Flonase or Nasacort.  Claritin or Zyrtec may also be tried in combination with the nasal sprays.  You may also try over-the-counter decongestants but please use this sparingly as this can also raise your blood pressure.  This may help to relieve some of that fluid.       Orders are in place for you to have fasting blood work completed. Please do not eat or drink 8 hours prior to having your blood drawn.   You may have your blood work completed in this building through "[employer] ([address] [facility] [address], [place] [address]).  For this location, you may schedule an appointment online or drop-in for walk-in services. [email or web address]  Hours:   Monday - Friday: 7:30 a.m. - 5 p.m. and Saturday: 8 a.m. - 11 a.m.  Phone:  [phone]      Orders are in place for you to complete imaging for additional assessment purposes(Ultra Sound of right leg).  You can have this completed at [facility].  Please contact the radiology department there to schedule.  The number is [phone]        Please arrange for routine follow up in 6 months. You may schedule on-line through [employer] or call our office at [phone].
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
## 2025-05-08 NOTE — ASSESSMENT & PLAN NOTE
Patient continues on daily statin therapy without issue.  Will continue to monitor fasting cholesterol levels routinely for surveillance purposes

## 2025-05-08 NOTE — ASSESSMENT & PLAN NOTE
Ultrasound ordered today for assessment purposes.  Patient reports left inguinal hernia repair many years ago

## 2025-05-15 LAB
25(OH)D3+25(OH)D2 SERPL-MCNC: 26 NG/ML (ref 30–100)
ALBUMIN SERPL-MCNC: 4.6 G/DL (ref 3.6–5.1)
ALP SERPL-CCNC: 72 U/L (ref 35–144)
ALT SERPL-CCNC: 15 U/L (ref 9–46)
ANION GAP SERPL CALCULATED.4IONS-SCNC: 7 MMOL/L (CALC) (ref 7–17)
AST SERPL-CCNC: 22 U/L (ref 10–35)
BASOPHILS # BLD AUTO: 18 CELLS/UL (ref 0–200)
BASOPHILS NFR BLD AUTO: 0.4 %
BILIRUB SERPL-MCNC: 0.9 MG/DL (ref 0.2–1.2)
BUN SERPL-MCNC: 15 MG/DL (ref 7–25)
CALCIUM SERPL-MCNC: 9.3 MG/DL (ref 8.6–10.3)
CHLORIDE SERPL-SCNC: 104 MMOL/L (ref 98–110)
CHOLEST SERPL-MCNC: 146 MG/DL
CHOLEST/HDLC SERPL: 2.6 (CALC)
CO2 SERPL-SCNC: 30 MMOL/L (ref 20–32)
CREAT SERPL-MCNC: 0.96 MG/DL (ref 0.7–1.28)
EGFRCR SERPLBLD CKD-EPI 2021: 81 ML/MIN/1.73M2
EOSINOPHIL # BLD AUTO: 108 CELLS/UL (ref 15–500)
EOSINOPHIL NFR BLD AUTO: 2.4 %
ERYTHROCYTE [DISTWIDTH] IN BLOOD BY AUTOMATED COUNT: 12.8 % (ref 11–15)
EST. AVERAGE GLUCOSE BLD GHB EST-MCNC: 111 MG/DL
EST. AVERAGE GLUCOSE BLD GHB EST-SCNC: 6.2 MMOL/L
FERRITIN SERPL-MCNC: 286 NG/ML (ref 24–380)
GLUCOSE SERPL-MCNC: 93 MG/DL (ref 65–99)
HBA1C MFR BLD: 5.5 %
HCT VFR BLD AUTO: 43.2 % (ref 38.5–50)
HDLC SERPL-MCNC: 56 MG/DL
HGB BLD-MCNC: 13.8 G/DL (ref 13.2–17.1)
IRON SATN MFR SERPL: 27 % (CALC) (ref 20–48)
IRON SERPL-MCNC: 95 MCG/DL (ref 50–180)
LDLC SERPL CALC-MCNC: 76 MG/DL (CALC)
LYMPHOCYTES # BLD AUTO: 1278 CELLS/UL (ref 850–3900)
LYMPHOCYTES NFR BLD AUTO: 28.4 %
MCH RBC QN AUTO: 29.4 PG (ref 27–33)
MCHC RBC AUTO-ENTMCNC: 31.9 G/DL (ref 32–36)
MCV RBC AUTO: 92.1 FL (ref 80–100)
MONOCYTES # BLD AUTO: 311 CELLS/UL (ref 200–950)
MONOCYTES NFR BLD AUTO: 6.9 %
NEUTROPHILS # BLD AUTO: 2786 CELLS/UL (ref 1500–7800)
NEUTROPHILS NFR BLD AUTO: 61.9 %
NONHDLC SERPL-MCNC: 90 MG/DL (CALC)
PLATELET # BLD AUTO: 186 THOUSAND/UL (ref 140–400)
PMV BLD REES-ECKER: 11.2 FL (ref 7.5–12.5)
POTASSIUM SERPL-SCNC: 4.2 MMOL/L (ref 3.5–5.3)
PROT SERPL-MCNC: 6.8 G/DL (ref 6.1–8.1)
PSA SERPL-MCNC: 0.76 NG/ML
RBC # BLD AUTO: 4.69 MILLION/UL (ref 4.2–5.8)
SODIUM SERPL-SCNC: 141 MMOL/L (ref 135–146)
TIBC SERPL-MCNC: 346 MCG/DL (CALC) (ref 250–425)
TRIGL SERPL-MCNC: 59 MG/DL
TSH SERPL-ACNC: 2.16 MIU/L (ref 0.4–4.5)
VIT B12 SERPL-MCNC: 288 PG/ML (ref 200–1100)
WBC # BLD AUTO: 4.5 THOUSAND/UL (ref 3.8–10.8)

## 2025-05-20 ENCOUNTER — HOSPITAL ENCOUNTER (OUTPATIENT)
Dept: RADIOLOGY | Facility: HOSPITAL | Age: 75
Discharge: HOME | End: 2025-05-20
Payer: MEDICARE

## 2025-05-20 DIAGNOSIS — K40.90 RIGHT INGUINAL HERNIA: ICD-10-CM

## 2025-05-20 PROCEDURE — 76882 US LMTD JT/FCL EVL NVASC XTR: CPT | Performed by: RADIOLOGY

## 2025-05-20 PROCEDURE — 76882 US LMTD JT/FCL EVL NVASC XTR: CPT

## (undated) DEVICE — DRESSING, GAUZE, SPONGE, KERLIX, SUPER, 6 X 6.75 IN, STERILE 10PK

## (undated) DEVICE — CATHETER, ACUSON ACUNAV ULTRASOUND, 8FR 90CM

## (undated) DEVICE — IRRIGATION SYSTEM, WOUND, PULSAVAC PLUS

## (undated) DEVICE — STOCKINETTE, IMPERVIOUS, 12 X 48 IN, LF, STERILE

## (undated) DEVICE — SUTURE, CTD, VICRYL, 2-0, UND, BR, CT-2

## (undated) DEVICE — HEMOSTAT, ARISTA, ABSORBABLE, 3 GRAMS

## (undated) DEVICE — APPLICATOR, CHLORAPREP, W/ORANGE TINT, 26ML

## (undated) DEVICE — SKIN CLOSURE SYS, PREMIERPRO EXOFIN, 1-4CM X 22CM, 1.75G TUBE

## (undated) DEVICE — COVER, TABLE, 44X90

## (undated) DEVICE — SYRINGE, 60 CC, LUER LOCK, MONOJECT, W/O CAP, LF

## (undated) DEVICE — SUCTION TIP , YANKAUER, W/BULB SUCTION

## (undated) DEVICE — BANDAGE, COFLEX, 6 X 5 YDS, FOAM TAN, STERILE, LF

## (undated) DEVICE — DEVICE, CLOSURE, PERCLOSE, PROSTYLE

## (undated) DEVICE — SHEATH, PINNACLE, 10 CM,  8FR INTRODUCER, 8FR DIA, 2.5 CM DIALATOR

## (undated) DEVICE — NEEDLE, SPINAL, QUINCKE, 18 G X 3.5 IN, PINK HUB

## (undated) DEVICE — GUIDEWIRE, INQWIRE, 3MM J, .035, 150

## (undated) DEVICE — SHEATH, PINNACLE, W/O GUIDEWIRE, 8FR INTRODUCER,  8FR DIA, 2.5 CM DILATOR

## (undated) DEVICE — PAD, GROUNDING, ELECTROSURGICAL, W/9 FT CABLE, POLYHESIVE II, ADULT, LF

## (undated) DEVICE — SUTURE, V-LOC, 3-0, 23IN, P-12, 90 ABS

## (undated) DEVICE — BLADE, OSCILLATOR 19.5 X 86 X 1.27MM

## (undated) DEVICE — Device

## (undated) DEVICE — ACCESS KIT, S-MAK MINI, 4FR 10CM 0.018IN 40CM, NT/PT, ECHO ENHANCE NEEDLE

## (undated) DEVICE — SEALER, BIPOLAR, AQUA MANTYS 6.0

## (undated) DEVICE — TIP, SUCTION, YANKAUER, FLEXIBLE

## (undated) DEVICE — VERSACROSS KIT, ACCESS SOLUTION, RF WIRE 230CM

## (undated) DEVICE — ELECTRODE, ELECTROSURGICAL, BLADE, NONSTICK, MODIFIED, 6.5 IN X 165 MM

## (undated) DEVICE — SUTURE, VICRYL, 1, 36 IN, CT-1, UNDYED

## (undated) DEVICE — GLOVE, SURGICAL, PROTEXIS NEOPRENE, 8.5, PF, LF

## (undated) DEVICE — GLOVE, SURGICAL, PROTEXIS NEOPRENE, 8.0, PF, LF

## (undated) DEVICE — DRAPE PACK, TOTAL HIP, CUSTOM, GEAUGA

## (undated) DEVICE — SHEATH, PINNACLE, 10 CM,  7FR INTRODUCER, 7FR DIA, 2.5 CM DIALATOR

## (undated) DEVICE — DRESSING, MEPILEX BORDER, POST-OP AG, 4 X 10 IN

## (undated) DEVICE — ELECTRODE, QUICK-COMBO, EDGE SYSTEM, REDI PACK

## (undated) DEVICE — SUTURE, ETHIBOND, 2, 30 IN, CT2, GREEN

## (undated) DEVICE — 6FR X 110CM, PIG 145 VENTRICULAR PIGTAIL CURVE, EXPO ANGIO CATH (EA)

## (undated) DEVICE — DRAPE, SHEET, U, W/ADHESIVE STRIP, IMPERVIOUS, 60 X 70 IN, DISPOSABLE, LF, STERILE

## (undated) DEVICE — KIT, MINOR, DOUBLE BASIN

## (undated) DEVICE — DRAPE, SHEET, ORTHOPEDIC, EXTREMITY, BILATERAL, W/ARM BOARD COVERS, 72 X 120 IN, DISPOSABLE, LF, STERILE

## (undated) DEVICE — SYSTEM, ACCESS, FXD DBL CURVE, WATCHMAN

## (undated) DEVICE — CATHETER TRAY, SURESTEP, 14FR, PRECONNECTED DRAIN BAG